# Patient Record
Sex: MALE | Race: WHITE | Employment: OTHER | URBAN - METROPOLITAN AREA
[De-identification: names, ages, dates, MRNs, and addresses within clinical notes are randomized per-mention and may not be internally consistent; named-entity substitution may affect disease eponyms.]

---

## 2020-01-26 ENCOUNTER — HOSPITAL ENCOUNTER (INPATIENT)
Age: 35
LOS: 3 days | Discharge: HOME | DRG: 751 | End: 2020-01-29
Attending: EMERGENCY MEDICINE | Admitting: PSYCHIATRY & NEUROLOGY
Payer: MEDICAID

## 2020-01-26 PROBLEM — F33.9 MAJOR DEPRESSION, RECURRENT (HCC): Status: ACTIVE | Noted: 2020-01-26

## 2020-01-26 PROBLEM — F33.9 MAJOR DEPRESSIVE DISORDER, RECURRENT (HCC): Status: ACTIVE | Noted: 2020-01-26

## 2020-01-26 PROCEDURE — 99285 EMERGENCY DEPT VISIT HI MDM: CPT

## 2020-01-26 PROCEDURE — 6370000000 HC RX 637 (ALT 250 FOR IP): Performed by: PSYCHIATRY & NEUROLOGY

## 2020-01-26 PROCEDURE — 1240000000 HC EMOTIONAL WELLNESS R&B

## 2020-01-26 RX ORDER — NICOTINE 21 MG/24HR
1 PATCH, TRANSDERMAL 24 HOURS TRANSDERMAL DAILY
Status: DISCONTINUED | OUTPATIENT
Start: 2020-01-26 | End: 2020-01-29 | Stop reason: HOSPADM

## 2020-01-26 RX ORDER — BENZTROPINE MESYLATE 1 MG/ML
2 INJECTION INTRAMUSCULAR; INTRAVENOUS 2 TIMES DAILY PRN
Status: DISCONTINUED | OUTPATIENT
Start: 2020-01-26 | End: 2020-01-29 | Stop reason: HOSPADM

## 2020-01-26 RX ORDER — IBUPROFEN 400 MG/1
400 TABLET ORAL EVERY 6 HOURS PRN
Status: DISCONTINUED | OUTPATIENT
Start: 2020-01-26 | End: 2020-01-29 | Stop reason: HOSPADM

## 2020-01-26 RX ORDER — HYDROXYZINE HYDROCHLORIDE 25 MG/1
25 TABLET, FILM COATED ORAL 3 TIMES DAILY PRN
Status: DISCONTINUED | OUTPATIENT
Start: 2020-01-26 | End: 2020-01-29 | Stop reason: HOSPADM

## 2020-01-26 RX ORDER — ACETAMINOPHEN 325 MG/1
650 TABLET ORAL EVERY 4 HOURS PRN
Status: DISCONTINUED | OUTPATIENT
Start: 2020-01-26 | End: 2020-01-29 | Stop reason: HOSPADM

## 2020-01-26 RX ORDER — MAGNESIUM HYDROXIDE/ALUMINUM HYDROXICE/SIMETHICONE 120; 1200; 1200 MG/30ML; MG/30ML; MG/30ML
15 SUSPENSION ORAL EVERY 6 HOURS PRN
Status: DISCONTINUED | OUTPATIENT
Start: 2020-01-26 | End: 2020-01-29 | Stop reason: HOSPADM

## 2020-01-26 RX ORDER — TRAZODONE HYDROCHLORIDE 50 MG/1
50 TABLET ORAL NIGHTLY PRN
Status: DISCONTINUED | OUTPATIENT
Start: 2020-01-26 | End: 2020-01-29 | Stop reason: HOSPADM

## 2020-01-26 RX ADMIN — TRAZODONE HYDROCHLORIDE 50 MG: 50 TABLET ORAL at 20:50

## 2020-01-26 RX ADMIN — HYDROXYZINE HYDROCHLORIDE 25 MG: 25 TABLET, FILM COATED ORAL at 20:50

## 2020-01-26 ASSESSMENT — PAIN SCALES - GENERAL: PAINLEVEL_OUTOF10: 0

## 2020-01-26 ASSESSMENT — PATIENT HEALTH QUESTIONNAIRE - PHQ9: SUM OF ALL RESPONSES TO PHQ QUESTIONS 1-9: 6

## 2020-01-26 ASSESSMENT — SLEEP AND FATIGUE QUESTIONNAIRES
DO YOU USE A SLEEP AID: NO
DO YOU HAVE DIFFICULTY SLEEPING: NO
AVERAGE NUMBER OF SLEEP HOURS: 8

## 2020-01-26 ASSESSMENT — LIFESTYLE VARIABLES: HISTORY_ALCOHOL_USE: NO

## 2020-01-26 NOTE — ED PROVIDER NOTES
tobacco. He reports previous alcohol use. He reports current drug use. Drug: Marijuana. Family History: Noncontributory at this time  Psychiatric History: See PMH  Allergies:has No Known Allergies. PHYSICAL EXAM     INITIAL VITALS: BP: (!) 128/92  Pulse: 96  Resp: 18  Temp: 98 °F (36.7 °C) SpO2: 100 %     Constitutional:  Well developed, no acute distress   Eyes:  Pupils equal and readily reactive to light  HENT:  Atraumatic, external ears normal, nose normal, oropharynx moist. Neck- supple    Respiratory:  Clear to auscultation bilaterally with good air exchange  Cardiovascular:  RRR with normal S1 and S2  GI:  Soft, nondistended and nontender   Musculoskeletal:  No edema, no tenderness, no deformities  Integument:  No rash  Neurologic:  Alert & oriented x 4, no focal deficits noted   Psychiatric: Anxious      EMERGENCY DEPARTMENT COURSE     78-year-old male presents with suicidal thoughts with a plan to cut his wrist.  He is afebrile, nontoxic, normal vital signs. Not in acute distress. He has no other medical complaints. He does appear slightly anxious. Patient is medically cleared for psychiatric evaluation and likely admission. FINAL IMPRESSION     1. Depression with suicidal ideation          DISPOSITION:  DISPOSITION Decision To Admit 01/26/2020 03:28:26 PM        PATIENT REFERRED TO:  No follow-up provider specified. DISCHARGE MEDICATIONS:  New Prescriptions    No medications on file       (Please note that portions of this note were completed with a voice recognition program. Efforts were made to edit the dictations but occasionally words are mis-transcribed.  Whenever words are used in this note in any gender, they shall be construed as though they were used in the gender appropriate to the circumstances; and whenever words are used in this note in the singular or plural form, they shall be construed as though they were used in the form appropriate to the circumstances.)    Veronica Mandujano Taryn Duarte DO  Attending Emergency Medicine Physician        Melissa Lyons,   01/26/20 0879

## 2020-01-26 NOTE — ED NOTES
Provisional Diagnosis:   Major Depressive Disorder    Psychosocial and Contextual Factors:     Patient has recent loss of loved one. Patient has family issues. C-SSRS Summary:      Patient: X  Family:   Agency:         Present Suicidal Behavior:  X    Verbal:  Patient reports suicidal ideations with plan to swallow cyanide. Attempt: Patient denies. Past Suicidal Behavior: Patient denies. Verbal:     Attempt:       Substance Abuse: Patient reports marijuana use. Self-Injurious/Self-Mutilation: Patient denies. Trauma Identified:  Patient denies. Protective Factors:    Patient has housing. Patient has insurance. Risk Factors:    Patient is not linked. Patient is not taking psych medications. Patient has minimal support system. Clinical Summary:    Patient is a 37year old male that is brought to the ED today by [employer] for suicidal ideations. Patient reports he had a knife in his hand and was contemplating cutting his wrists. Patient reprots his wife called 911 and the  brought him to the ER. Patient reprots if she did not call the police \"I would probably be dead right now. \" Patient reports he has had suicidal ideations for the past 2 weeks after losing his grandma. Patient reports he had been thinking about eating cyanide as well. Patient denies any previous attempts to harm himself. Patient denies H/I at this time. Patient denies auditory or visual hallucinations. Patient reports he is not currently linked to a mental health agency or taking any psych medications. Patient tells this writer he is Raymon Rayo" being in the ER today. Patient states \"I want to think I can do this but I don't know because I have never felt this way before. \"     Patient reports he use to drink alcohol heavily but no longer drinks alcohol. Patient reports occasional marijuana use. Patient denies any other drug use at this time.      Patient reports having safe housing with his wife who

## 2020-01-26 NOTE — BH NOTE
techniques in how to quit, available resources  ( ) Referral for counseling faxed to [facility]                                           ( X) Patient refused counseling  ( ) Patient has not smoked in the last 30 days    Metabolic Screening:    No results found for: LABA1C    No results found for: CHOL  No results found for: TRIG  No results found for: HDL  No components found for: LDLCAL  No results found for: LABVLDL      Body mass index is 18.8 kg/m². BP Readings from Last 2 Encounters:   01/26/20 122/79           Pt admitted with followings belongings:  Dentures: None  Vision - Corrective Lenses: None  Hearing Aid: None  Jewelry: None  Clothing: Footwear, Sweater, Shirt, Pants  Were All Patient Medications Collected?: Not Applicable  Other Valuables: [address] placed in safe in security envelope, number:  [id number]. No Patient home medications to be verified, Dr. Rosemarie Art notified. Patient oriented to surroundings and program expectations and copy of patient rights given. Received admission packet:  YES. Consents reviewed, signed Yes. Refused n/a. Patient verbalize understanding:  Yes. Patient education on precautions: Suicidal thoughts and patient safety.                     Edwin Chao RN

## 2020-01-27 PROBLEM — F33.1 MODERATE EPISODE OF RECURRENT MAJOR DEPRESSIVE DISORDER (HCC): Status: ACTIVE | Noted: 2020-01-26

## 2020-01-27 PROCEDURE — 1240000000 HC EMOTIONAL WELLNESS R&B

## 2020-01-27 PROCEDURE — 99253 IP/OBS CNSLTJ NEW/EST LOW 45: CPT | Performed by: PHYSICIAN ASSISTANT

## 2020-01-27 PROCEDURE — 6370000000 HC RX 637 (ALT 250 FOR IP): Performed by: PSYCHIATRY & NEUROLOGY

## 2020-01-27 PROCEDURE — 90833 PSYTX W PT W E/M 30 MIN: CPT | Performed by: REGISTERED NURSE

## 2020-01-27 PROCEDURE — 6370000000 HC RX 637 (ALT 250 FOR IP): Performed by: REGISTERED NURSE

## 2020-01-27 PROCEDURE — 99222 1ST HOSP IP/OBS MODERATE 55: CPT | Performed by: REGISTERED NURSE

## 2020-01-27 RX ADMIN — HYDROXYZINE HYDROCHLORIDE 25 MG: 25 TABLET, FILM COATED ORAL at 12:29

## 2020-01-27 RX ADMIN — SERTRALINE HYDROCHLORIDE 50 MG: 50 TABLET ORAL at 12:29

## 2020-01-27 RX ADMIN — TRAZODONE HYDROCHLORIDE 50 MG: 50 TABLET ORAL at 21:58

## 2020-01-27 ASSESSMENT — SLEEP AND FATIGUE QUESTIONNAIRES
AVERAGE NUMBER OF SLEEP HOURS: 10
DO YOU HAVE DIFFICULTY SLEEPING: NO
DO YOU USE A SLEEP AID: NO

## 2020-01-27 ASSESSMENT — PATIENT HEALTH QUESTIONNAIRE - PHQ9: SUM OF ALL RESPONSES TO PHQ QUESTIONS 1-9: 6

## 2020-01-27 ASSESSMENT — LIFESTYLE VARIABLES: HISTORY_ALCOHOL_USE: NO

## 2020-01-27 NOTE — H&P
HISTORY and [facility] [address] [id number]       NAME:  Sueann Angelucci  MRN: [id number]   YOB: 1985   Date: 1/27/2020   Age: 37 y.o. Gender: male     COMPLAINT AND PRESENT HISTORY:      Sueann Angelucci is 37 y.o.,  male, admitted because of depression/ Bipolar Disorder. Pt has suicidal ideation, Pt has plans to cut self. Pt denies any homicidal ideation. Pt has no history of previous suicide attempts. Pt feels hopeless, helpless, worthless, lack of interest, loss of energy. Poor insight. Pt has fair sleep, appetite, concentration and memory. No current auditory, visual or tactile hallucinations. Patient denies any current alcohol or substance abuse. Has been sober from alcohol for 5 months patient lives with spouse, currently in between jobs. Pt is not taking any psychiatric medications. DIAGNOSTIC RESULTS   Labs:      PAST MEDICAL HISTORY     Past Medical History:   Diagnosis Date    Alcohol abuse     Kidney stones     Seizures ([address]     [employer] [facility]        Pt denies any history of Diabetes mellitus type 2, hypertension, stroke, heart disease, COPD, Asthma, GERD, HLD, Cancer, Seizures,Thyroid disease, Kidney Disease, Hepatitis, TB.    SURGICAL HISTORY     History reviewed. No pertinent surgical history.     FAMILY HISTORY       Family History   Family history unknown: Yes       SOCIAL HISTORY       Social History     Socioeconomic History    Marital status: Single     Spouse name: None    Number of children: None    Years of education: None    Highest education level: None   Occupational History    None   Social Needs    Financial resource strain: None    Food insecurity:     Worry: None     Inability: None    Transportation needs:     Medical: None     Non-medical: None   Tobacco Use    Smoking status: Current Every Day Smoker     Packs/day: 0.50     Types: Cigarettes    Smokeless tobacco: Former User   Substance and Sexual Activity    Alcohol use: Not Currently     Comment: Sober since September 2018    Drug use: Yes     Types: Marijuana     Comment: very rare    Sexual activity: Yes     Partners: Female   Lifestyle    Physical activity:     Days per week: None     Minutes per session: None    Stress: None   Relationships    Social connections:     Talks on phone: None     Gets together: None     Attends Alevism service: None     Active member of club or organization: None     Attends meetings of clubs or organizations: None     Relationship status: None    Intimate partner violence:     Fear of current or ex partner: None     Emotionally abused: None     Physically abused: None     Forced sexual activity: None   Other Topics Concern    None   Social History Narrative    None           REVIEW OF SYSTEMS      Allergies   Allergen Reactions    Cashews [Macadamia Nut Oil]     Mushroom Extract Complex        No current facility-administered medications on file prior to encounter. No current outpatient medications on file prior to encounter. General health:  Fairly good. No fever or chills. Skin:  No itching, redness or rash. Head, eyes, ears, nose, throat:  No headache, epistaxis, rhinorrhea hearing loss or sore throat. Neck:  No pain, stiffness or masses. Cardiovascular/Respiratory system:  No chest pain, palpitation, shortness of breath, coughing or expectoration. Gastrointestinal tract: No abdominal pain, nausea, vomiting, dysphagia, diarrhea or constipation. Genitourinary:  No burning on micturition. No hesitancy, urgency, frequency or discoloration of urine. Locomotor:  No bone or joint pains. No swelling or deformities. Neuropsychiatric:  See HPI.      GENERAL PHYSICAL EXAM:     Vitals: /66   Pulse 90   Temp 97.9 °F (36.6 °C) (Oral)   Resp 14   Ht 5' 10\" (1.778 m)   Wt 131 lb (59.4 kg)   SpO2 100%   BMI 18.80 kg/m²  Body mass index is 18.8

## 2020-01-27 NOTE — PLAN OF CARE
Patient is controlled and cooperative, patient attended night groups. Patient is out and social with peers. Patient denies any suicidal ideations, states he has been stressed out at home taking care of his wife and children which all had the flu at the same time. Patient states he has not been sleeping well and just needs a break. Patient has good insight. Q15 min safety checks maintained.

## 2020-01-27 NOTE — PLAN OF CARE
Patient pleasant on approach. Patient denied suicidal and homicidal ideations. Patient voiced feeling depressed off then on due to life stressors. Patient remain safe while on the unit. Patient encourage to continue his medication regime. 100 % meal intake. 15 MIN safety checks.

## 2020-01-27 NOTE — CARE COORDINATION
BHI Biopsychosocial Assessment    Current Level of Psychosocial Functioning     Independent   Dependent    Minimal Assist X    Psychosocial High Risk Factors     Unable to obtain meds   Chronic illness/pain    Substance abuse   Lack of Family Support   Financial stress   Isolation   Inadequate Community Resources  Suicide attempt(s)  Self Mutilation  Safety Plan X- safety plan provided and explained to patient to fill out and return to staff   Not taking medications X- not linked  Victim of crime   Developmental Delay  Unable to manage personal needs    Age 72 or older   Homeless  Legal Issues  No transportation   Readmission within 30 days  Unemployment  Traumatic Event X- death of grandmother 2 weeks ago     Psychiatric Advanced Directives:  none    Family to Involve in Treatment: wife Madalyn Means but refused WILVER reports he will get updated     Sexual Orientation:  Heerosexual    Patient Strengths: stable housing, self employed construction, transportation, support system, motivated    Patient Barriers:  Not linked, unresolved grief, no insurance     Opiate/AOD Education Provided:   No hx of AOD dependence     CMHC/mental health history: no prior hx     Plan of Care   medication management, group/individual therapies, family meetings, psycho -education, treatment team meetings to assist with stabilization    Initial Discharge Plan:  Link to [address] [place] [address] on file and taxi home if wife unable to      Clinical Summary:   Patient is a  37year old  male admitted to the [facility] for suicidal ideations with plan to poison self and not able to contract for safety. Patient currently is denying any suicidal / homicidal ideations and is denying any auditory / visual hallucinations currently. Patient reports hx of \"depressive times\" and reports they come and go, but none has severe as of now.   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

## 2020-01-27 NOTE — PLAN OF CARE
[facility]  Initial Interdisciplinary Treatment Plan NO      Original treatment plan Date & Time: 1/27/2020 0822    Admission Type:  Admission Type: Voluntary    Reason for admission:   Reason for Admission: Suicidal thoughts to harm self     Estimated Length of Stay:  5-7days  Estimated Discharge Date: to be determined by physician    PATIENT STRENGTHS:  Patient Strengths:Strengths: Social Skills, Communication, Positive Support  Patient Strengths and Limitations:Limitations: Inappropriate/potentially harmful leisure interests, Difficulty problem solving/relies on others to help solve problems  Addictive Behavior: Addictive Behavior  In the past 3 months, have you felt or has someone told you that you have a problem with:  : None  Do you have a history of Chemical Use?: No  Do you have a history of Alcohol Use?: No  Do you have a history of Street Drug Abuse?: No  Histroy of Prescripton Drug Abuse?: No  Medical Problems:  Past Medical History:   Diagnosis Date    Seizures ([address]     [employer] [facility]      Status EXAM:Status and Exam  Normal: No  Facial Expression: Flat  Affect: Appropriate  Level of Consciousness: Alert  Mood:Normal: No  Mood: Anxious, Sad, Depressed  Motor Activity:Normal: Yes  Interview Behavior: Cooperative  Preception: [place] to Person, [place] to Time, [place] to Place, [place] to Situation  Attention:Normal: Yes  Thought Content:Normal: Yes  Hallucinations: None  Delusions: No  Memory:Normal: Yes  Insight and Judgment: No  Insight and Judgment: Poor Insight  Present Suicidal Ideation: No  Present Homicidal Ideation: No    EDUCATION:   Learner Progress Toward Treatment Goals: reviewed group plans and strategies for care    Method:group therapy, medication compliance, individualized assessments and care planning    Outcome: needs reinforcement    PATIENT GOALS: to be discussed with patient within 72 hours    PLAN/TREATMENT RECOMMENDATIONS:     continue group therapy , medications compliance, goal setting, individualized assessments and care, continue to monitor pt on unit      SHORT-TERM GOALS:   Time frame for Short-Term Goals: 5-7 days    LONG-TERM GOALS:  Time frame for Long-Term Goals: 6 months  Members Present in Team Meeting: See [address]

## 2020-01-27 NOTE — GROUP NOTE
Group Therapy Note    Date: 1/27/2020    Group Start Time: 1000  Group End Time: 1152  Group Topic: Cognitive Skills    [facility] YAN Krishnamurthy    Patient refused to attend cognitive skills/ leisure group at 1000 after encouragement from staff. 1:1 talk time provided by staff.     Signature:  Cornel Krishnamurthy

## 2020-01-27 NOTE — BH NOTE
Psychiatric Admission Note         Bouchra Churchill is a 37 y.o. male who was admitted from [facility] ED with increasing depression and suicidal ideation. He states his grandmother  2 weeks ago and his depression has increased. He told his wife he was contemplating cutting his wrists. His wife called the police. He reports today that he has had depression on and off for many years. He states he has never taken any antidepressant. He states he always has just \"pulled himself up by his bootstraps\". The patient presents with feelings of being sad, depressed, hopelessness and helpless, loss of interest in usual activities, sleep disturbance with difficulty getting to sleep, feelings of guilt, worthlessness and loss of self confidence. The patient also reports psychomotor retardation. He denies hallucinations or paranoia. He denies any psychotic features. He denies symptoms of hosea. Past Psychiatric History   Patient Denies any history of outpt mental health care. Denied history of psychiatric inpatient hospitalizations. Denied history of suicide attempts. History of Substance Abuse     He reports he uses marijuana occasionally. He states he used to drink heavily but quit drinking 6 months ago because his wife is pregnant. Family History of psychiatric disorders    Family history: denied . Past psychiatric medications  None    Medical History   Allergies:  Cashews [macadamia nut oil] and Mushroom extract complex   Past Medical History:   Diagnosis Date    Seizures ([address]     [employer] [facility]       History reviewed. No pertinent surgical history. Neurologic Exam    See H&P for further physical examination. SOCIAL HISTORY   He reports he was raised by his mother. His father was not a part of his life. His grandmother who recently passed was a big part of his life and raised him at some points of his life. He reports he has siblings.   He has homicidal ideations . Patient's gross cognitive functions were impaired. Insight and judgement were fair. Both recent and remote memory were impaired. Psychomotor status was without abnormality     Labs  No results found for this or any previous visit (from the past 72 hour(s)). Diagnostic Impression  Principal Problem: Moderate episode of recurrent major depressive disorder ([facility] [address]  Resolved Problems:    * No resolved hospital problems. *          Medications   sertraline  50 mg Oral Daily    nicotine  1 patch Transdermal Daily     acetaminophen, traZODone, benztropine mesylate, magnesium hydroxide, aluminum & magnesium hydroxide-simethicone, hydrOXYzine, ibuprofen    Treatment Plan:     Admit to inpatient psychiatric treatment   Supportive therapy with medication management. Reviewed risks and benefits as well as potential side effects with patient. Start Zoloft 50 mg daily.  Therapeutic activities and groups   Follow up at [facility] after symptoms stabilized   Provided supportive psychotherapy for 20 minutes that included support, psychoeducation about major depression, education about coping methods and empatic listening. Estimated length of stay: 5-7 days    KATE Sanchez - CNS  Psychiatric Advanced Practice Nurse  Tanna voice recognition software used in portions of this document.  Occasionally words are mis-transcribed

## 2020-01-28 PROCEDURE — 1240000000 HC EMOTIONAL WELLNESS R&B

## 2020-01-28 PROCEDURE — 99232 SBSQ HOSP IP/OBS MODERATE 35: CPT | Performed by: REGISTERED NURSE

## 2020-01-28 PROCEDURE — 6370000000 HC RX 637 (ALT 250 FOR IP): Performed by: PSYCHIATRY & NEUROLOGY

## 2020-01-28 RX ADMIN — TRAZODONE HYDROCHLORIDE 50 MG: 50 TABLET ORAL at 20:50

## 2020-01-28 RX ADMIN — HYDROXYZINE HYDROCHLORIDE 25 MG: 25 TABLET, FILM COATED ORAL at 20:50

## 2020-01-28 NOTE — BH NOTE
Patient refused to attend health/wellness group at 4pm after encouragement from staff. 1:1 talk time offered but refused.

## 2020-01-28 NOTE — PLAN OF CARE
Nutrition (WDL): Within Defined Limits    Patient Monitoring:  Frequency of Checks: 4 times per hour, close    Psychiatric Symptoms:   Depression Symptoms  Depression Symptoms: No problems reported or observed. Anxiety Symptoms  Anxiety Symptoms: Generalized  Esme Symptoms  Seme Symptoms: No problems reported or observed. Psychosis Symptoms  Delusion Type: No problems reported or observed. Suicide Risk CSSR-S:  1) Within the past month, have you wished you were dead or wished you could go to sleep and not wake up? : Yes  2) Have you actually had any thoughts of killing yourself? : Yes  3) Have you been thinking about how you might kill yourself? : Yes  5) Have you started to work out or worked out the details of how to kill yourself? Do you intend to carry out this plan? : Yes  6) Have you ever done anything, started to do anything, or prepared to do anything to end your life?: No  Change in Result  NA  Change in Plan of care  NA       EDUCATION:   EDUCATION:   Learner Progress Toward Treatment Goals: Reviewed results and recommendations of this team, Reviewed group plan and strategies, Reviewed signs, symptoms and risk of self harm and violent behavior, Reviewed goals and plan of care    Method:small group, individual verbal education    Outcome:verbalized by patient, but needs reinforcement to obtain goals    PATIENT GOALS:  Short term: Patient short term goals include attending groups and continuing medication. Long term: Patient long term goals include getting back into old hobbies to keep busy, get back to work, continue taking medications.      PLAN/TREATMENT RECOMMENDATIONS UPDATE: continue with group therapies, increased socialization, continue planning for after discharge goals, continue with medication compliance    SHORT-TERM GOALS UPDATE:   Time frame for Short-Term Goals: 5-7 days    LONG-TERM GOALS UPDATE:   Time frame for Long-Term Goals: 6 months  Members Present in Team Meeting: See

## 2020-01-28 NOTE — GROUP NOTE
Group Therapy Note    Date: 1/28/2020    Group Start Time: 1000  Group End Time: 8847  Group Topic: Psychoeducation    [facility]      Patient declined to attend recreational therapy group at 1000 despite encouragement from staff. 1:1 talk time offered by staff as alternative to group session.       Signature:  Mariah Mejía

## 2020-01-28 NOTE — GROUP NOTE
Group Therapy Note    Date: 1/27/2020    Group Start Time: 2100  Group End Time: 2130  Group Topic: Wrap-Up    KRISHAN BHJUVENCIO Todd RN        Group Therapy Note    Attendees: 7           Discipline Responsible:   OT  AT   x Nsg.  RT  Other       Participation Level:     None  Minimal   x Active Listener x Interactive    Monopolizing         Participation Quality:  x Appropriate  Inappropriate   x       Attentive        Intrusive   x       Sharing        Resistant   x       Supportive        Lethargic       Affective:   x Congruent  Incongruent  Blunted  Flat    Constricted  Anxious  Elated  Angry    Labile  Depressed  Other         Cognitive:  x Alert  Oriented PPTP     Concentration x G  F  P   Attention Span x G  F  P   Short-Term Memory x G  F  P   Long-Term Memory x G  F  P   ProblemSolving/  Decision Making x G  F  P   Ability to Process  Information x G  F  P      Contributing Factors             Delusional             Hallucinating             Flight of Ideas             Other:       Modes of Intervention:  x Education  Support  Exploration   x Clarifying  Problem Solving  Confrontation    Socialization  Limit Setting  Reality Testing    Activity  Movement  Media    Other:            Response to Learning:  x Able to verbalize current knowledge/experience   x Able to verbalize/acknowledge new learning    Able to retain information    Capable of insight    Able to change behavior    Progressing to goal    Other:        Comments:

## 2020-01-28 NOTE — PLAN OF CARE
Patient is able to verbalize acceptance of life and situations over which he has no control over. Patient is provided with a safe environment, he is med compliant and behavior controlled.  Will continue to monitor patient for safety Q 15 MIN          Problem: Depressive Behavior With or Without Suicide Precautions:  Goal: Able to verbalize acceptance of life and situations over which he or she has no control  Description  Able to verbalize acceptance of life and situations over which he or she has no control  1/28/2020 1731 by Araceli Saini LPN  Outcome: Ongoing       Problem: Suicide risk  Goal: Provide patient with safe environment  Description  Provide patient with safe environment  1/28/2020 1731 by Araceli Saini LPN  Outcome: Ongoing

## 2020-01-28 NOTE — GROUP NOTE
Group Therapy Note    Date: 1/28/2020    Group Start Time: 1330  Group End Time: 1829  Group Topic: Psychoeducation    JENNIFER EldridgeS    Group Therapy Note    Attendees: 5    Patient's Goal:  Pt will demonstrate improved interpersonal skills    Notes:  Pt attended group and participated    Status After Intervention:  Improved    Participation Level:  Active Listener and Interactive    Participation Quality: Appropriate, Attentive, Sharing and Supportive      Speech:  normal      Thought Process/Content: Logical  Linear      Affective Functioning: Congruent      Mood: euthymic      Level of consciousness:  Alert, Oriented x4 and Attentive      Response to Learning: Able to verbalize current knowledge/experience, Able to verbalize/acknowledge new learning, Able to retain information, Capable of insight, Able to change behavior and Progressing to goal      Endings: None Reported    Modes of Intervention: Education, Support, Socialization, Exploration and Activity      Discipline Responsible: Psychoeducational Specialist      Signature:  John Saucedo [place]

## 2020-01-28 NOTE — GROUP NOTE
Group Therapy Note    Date: 1/28/2020    Group Start Time: 1000  Group End Time: 4212  Group Topic: Psychotherapy    [facility] [address] CIT[address] [place]        Group Therapy Note    Attendees: 4         Patient's Goal:  Expression of feeling     Notes:      Status After Intervention:  Unchanged    Participation Level:  Active Listener and Interactive    Participation Quality: Appropriate and Attentive      Speech:  normal      Thought Process/Content: Logical      Affective Functioning: Congruent      Mood: euthymic      Level of consciousness:  Alert and Oriented x4      Response to Learning: Able to verbalize current knowledge/experience and Able to verbalize/acknowledge new learning      Endings: None Reported    Modes of Intervention: Education and Support      Discipline Responsible: /Counselor      Signature:  CITLALY Henao

## 2020-01-28 NOTE — PLAN OF CARE
Problem: Depressive Behavior With or Without Suicide Precautions:  Goal: Able to verbalize acceptance of life and situations over which he or she has no control  Description  Able to verbalize acceptance of life and situations over which he or she has no control  1/27/2020 2241 by Isac Emerson LPN  Outcome: Ongoing  Note:   Patient denies all feelings of wanting to hurt himself or others. Patient irritable at times and does not like change. Patient was upset about getting a roommate and a sign put on his door. Patient easily redirected. Patient educated on seeking help from staff when feeling overwhelmed. Problem: Depressive Behavior With or Without Suicide Precautions:  Goal: Able to verbalize and/or display a decrease in depressive symptoms  Description  Able to verbalize and/or display a decrease in depressive symptoms  1/27/2020 2241 by Isac Emerson LPN  Outcome: Ongoing  Note:   Patient out of room and active with peers. Patient flat with responses but does make eye contact. Patient very distracted and hard to stay focused. Patient educated on seeking help from staff if feelings come back.

## 2020-01-28 NOTE — GROUP NOTE
Group Therapy Note    Date: 1/28/2020    Group Start Time: 0900  Group End Time: 0915  Group Topic: Community Meeting    [facility]    Patient refused to attend community meeting and goal setting group at 0900 after encouragement from staff. 1:1 talk time offered by staff as alternative to group session.

## 2020-01-29 VITALS
DIASTOLIC BLOOD PRESSURE: 68 MMHG | BODY MASS INDEX: 18.75 KG/M2 | TEMPERATURE: 98.1 F | HEART RATE: 100 BPM | HEIGHT: 70 IN | OXYGEN SATURATION: 100 % | RESPIRATION RATE: 14 BRPM | WEIGHT: 131 LBS | SYSTOLIC BLOOD PRESSURE: 115 MMHG

## 2020-01-29 PROCEDURE — 6370000000 HC RX 637 (ALT 250 FOR IP): Performed by: REGISTERED NURSE

## 2020-01-29 PROCEDURE — 99239 HOSP IP/OBS DSCHRG MGMT >30: CPT | Performed by: REGISTERED NURSE

## 2020-01-29 PROCEDURE — 6370000000 HC RX 637 (ALT 250 FOR IP): Performed by: PSYCHIATRY & NEUROLOGY

## 2020-01-29 RX ORDER — HYDROXYZINE HYDROCHLORIDE 25 MG/1
25 TABLET, FILM COATED ORAL 3 TIMES DAILY PRN
Qty: 30 TABLET | Refills: 0 | Status: SHIPPED | OUTPATIENT
Start: 2020-01-29 | End: 2020-02-08

## 2020-01-29 RX ORDER — TRAZODONE HYDROCHLORIDE 50 MG/1
50 TABLET ORAL NIGHTLY PRN
Qty: 14 TABLET | Refills: 0 | Status: SHIPPED | OUTPATIENT
Start: 2020-01-29

## 2020-01-29 RX ADMIN — HYDROXYZINE HYDROCHLORIDE 25 MG: 25 TABLET, FILM COATED ORAL at 12:06

## 2020-01-29 RX ADMIN — SERTRALINE HYDROCHLORIDE 50 MG: 50 TABLET ORAL at 07:31

## 2020-01-29 ASSESSMENT — PAIN SCALES - GENERAL: PAINLEVEL_OUTOF10: 0

## 2020-01-29 NOTE — PROGRESS NOTES
CLINICAL PHARMACY NOTE: MEDS TO [address] Select Patient?: No  Total # of Prescriptions Filled: 3   The following medications were delivered to the patient:  · Trazodone  · Sertraline  · Hydroxyzine  ·   Total # of Interventions Completed: 0  Time Spent (min): 30    Additional Documentation:
RT ASSESSMENT TREATMENT GOALS    [x]Pt Goal:  Pt will identify 1-2 positive coping skills by time of discharge. []Pt Goal:  Pt will identify 1-2 positive aspects of self by time of discharge. []Pt Goal:  Pt will remain on task/topic for 15-30 minutes during group by time of discharge. []Pt Goal:  Pt will identify 1-2 aspects of relapse prevention plan by time of discharge. [x]Pt Goal:  Pt will join in conversation with peers 1-2 times per group by time of discharge. [x]Pt Goal:  Pt will identify 1-2 new leisure interests by time of discharge. []Pt Goal:  Pt will not voice any delusional content by time of discharge.
(TYLENOL) tablet 650 mg  650 mg Oral Q4H PRN Jeff Izquierdo MD        traZODone (DESYREL) tablet 50 mg  50 mg Oral Nightly PRN Jeff Izquierdo MD   50 mg at 01/27/20 2158    benztropine mesylate (COGENTIN) injection 2 mg  2 mg Intramuscular BID PRN Jeff Izquierdo MD        magnesium hydroxide (MILK OF MAGNESIA) 400 MG/5ML suspension 30 mL  30 mL Oral Daily PRN Jeff Izquierdo MD        aluminum & magnesium hydroxide-simethicone (MAALOX) 200-200-20 MG/5ML suspension 15 mL  15 mL Oral Q6H PRN Jeff Izquierdo MD        nicotine (NICODERM CQ) 21 MG/24HR 1 patch  1 patch Transdermal Daily Jeff Izquierdo MD   1 patch at 01/28/20 5312    hydrOXYzine (ATARAX) tablet 25 mg  25 mg Oral TID PRN Jeff Izquierdo MD   25 mg at 01/27/20 1229    ibuprofen (ADVIL;MOTRIN) tablet 400 mg  400 mg Oral Q6H PRN Jeff Izquierdo MD             sertraline  50 mg Oral Daily    nicotine  1 patch Transdermal Daily       ASSESSMENT  Moderate episode of recurrent major depressive disorder ([facility] [address]     Patient's Response to Treatment: positive    PLAN    · Continue inpatient psychiatric treatment  · Supportive therapy with medication management. Reviewed risks and benefits as well as potential side effects with patient. · Continue Zoloft 50 mg daily. · Therapeutic activities and groups  · Follow up at [facility] after symptoms stabilized    Electronically signed by Cherylann Gottron, APRN - CNS on 1/28/2020 at 5:38 PM.    Dragon voice recognition software used in portions of this document.

## 2020-01-29 NOTE — DISCHARGE SUMMARY
DISCHARGE SUMMARY  Patient ID:  Sueann Angelucci  [id number]  37 y.o.  1985    Admit date: 2020    Discharge date and time: 2020  10:03 AM     Admitting Physician: Raven Francis MD     Discharge Physician:  KATE Lucas - CNS    Admission Diagnoses: Major depressive disorder, recurrent ([facility] [address] [F33.9]  Major depression, recurrent ([facility] [address] [F33.9]    Discharge Diagnoses: Moderate episode of recurrent major depressive disorder [facility])     Patient Active Problem List   Diagnosis Code    Moderate episode of recurrent major depressive disorder ([address] F33.1    Major depression, recurrent ([facility] [address] F33.9        Admission Condition: poor    Discharged Condition: stable    Indication for Admission: threat to self    History of Present Illnes (present tense wording indicates findings from admission exam on 2020 and are not necessarily indicative of current findings):   Sueann Angelucci is a 37 y.o. male who was admitted from [facility] with increasing depression and suicidal ideation. He states his grandmother  2 weeks ago and his depression has increased. He told his wife he was contemplating cutting his wrists. His wife called the police. He reports today that he has had depression on and off for many years. He states he has never taken any antidepressant. He states he always has just \"pulled himself up by his bootstraps\". The patient presents with feelings of being sad, depressed, hopelessness and helpless, loss of interest in usual activities, sleep disturbance with difficulty getting to sleep, feelings of guilt, worthlessness and loss of self confidence. The patient also reports psychomotor retardation. He denies hallucinations or paranoia. He denies any psychotic features. He denies symptoms of hosea. Hospital Course:   Upon admission, Sueann Angelucci was provided a safe secure environment, introduced to unit milieu. Patient participated in groups and individual therapies. Meds were adjusted. After few days of hospital care, patient began to feel improvement. Depression lifted, thoughts to harm self ceased. Sleep improved, appetite was good. On morning rounds 1/29/2020, patient endorsed feeling ready for discharge. Patient denies suicidal or homicidal ideations, denies hallucinations or delusions. Denies SE's from meds. It was decided that pt had achieved maximum benefit from hospital care and can be discharged     Consults:   None    Significant Diagnostic Studies: Routine labs and diagnostics    Treatments: Psychotropic medications, therapy with group, milieu, and 1:1 with nurses, social workers, PA-C/CNP, and Attending physician.       Discharge Medications:  Current Discharge Medication List      START taking these medications    Details   hydrOXYzine (ATARAX) 25 MG tablet Take 1 tablet by mouth 3 times daily as needed for Anxiety  Qty: 30 tablet, Refills: 0    Comments: meds to beds with voucher      sertraline (ZOLOFT) 50 MG tablet Take 1 tablet by mouth daily  Qty: 14 tablet, Refills: 0      traZODone (DESYREL) 50 MG tablet Take 1 tablet by mouth nightly as needed for Sleep  Qty: 14 tablet, Refills: 0              Core Measures statement:   Not applicable    Discharge Exam:  Level of consciousness:  Within normal limits  Appearance: Street clothes, seated, with good grooming  Behavior/Motor: No abnormalities noted  Attitude toward examiner:  Cooperative, attentive, good eye contact  Speech:  spontaneous, normal rate, normal volume and well articulated  Mood:  euthymic  Affect:  mood congruent  Thought processes:  linear, goal directed and coherent  Thought content:  Homocidal ideation denies  Suicidal Ideation:  denies suicidal ideation  Delusions:  no evidence of delusions  Perceptual Disturbance:  denies any perceptual disturbance  Cognition:  In tact  Memory: age appropriate  Insight & Judgement: fair  Medication side effects:  denies     Disposition: home    Patient Instructions:

## 2020-06-30 ENCOUNTER — HOSPITAL ENCOUNTER (EMERGENCY)
Age: 35
Discharge: HOME | End: 2020-06-30
Attending: EMERGENCY MEDICINE
Payer: MEDICAID

## 2020-06-30 VITALS
OXYGEN SATURATION: 98 % | TEMPERATURE: 98.4 F | RESPIRATION RATE: 18 BRPM | WEIGHT: 135 LBS | HEART RATE: 69 BPM | SYSTOLIC BLOOD PRESSURE: 100 MMHG | DIASTOLIC BLOOD PRESSURE: 66 MMHG

## 2020-06-30 LAB
ABSOLUTE EOS #: 0.03 K/UL (ref 0–0.44)
ABSOLUTE IMMATURE GRANULOCYTE: 0.03 K/UL (ref 0–0.3)
ABSOLUTE LYMPH #: 2.71 K/UL (ref 1.1–3.7)
ABSOLUTE MONO #: 0.41 K/UL (ref 0.1–1.2)
ACETAMINOPHEN LEVEL: <5 UG/ML (ref 10–30)
ANION GAP SERPL CALCULATED.3IONS-SCNC: 11 MMOL/L (ref 9–17)
BASOPHILS # BLD: 1 % (ref 0–2)
BASOPHILS ABSOLUTE: 0.06 K/UL (ref 0–0.2)
BUN BLDV-MCNC: 13 MG/DL (ref 6–20)
BUN/CREAT BLD: ABNORMAL (ref 9–20)
CALCIUM SERPL-MCNC: 8.8 MG/DL (ref 8.6–10.4)
CHLORIDE BLD-SCNC: 104 MMOL/L (ref 98–107)
CO2: 26 MMOL/L (ref 20–31)
CREAT SERPL-MCNC: 0.91 MG/DL (ref 0.7–1.2)
DIFFERENTIAL TYPE: ABNORMAL
EKG ATRIAL RATE: 76 BPM
EKG P AXIS: 6 DEGREES
EKG P-R INTERVAL: 126 MS
EKG Q-T INTERVAL: 372 MS
EKG QRS DURATION: 104 MS
EKG QTC CALCULATION (BAZETT): 418 MS
EKG R AXIS: 76 DEGREES
EKG T AXIS: 45 DEGREES
EKG VENTRICULAR RATE: 76 BPM
EOSINOPHILS RELATIVE PERCENT: 0 % (ref 1–4)
ETHANOL PERCENT: <0.01 %
ETHANOL: <10 MG/DL
GFR AFRICAN AMERICAN: ABNORMAL ML/MIN
GFR NON-AFRICAN AMERICAN: ABNORMAL ML/MIN
GFR SERPL CREATININE-BSD FRML MDRD: ABNORMAL ML/MIN/{1.73_M2}
GFR SERPL CREATININE-BSD FRML MDRD: ABNORMAL ML/MIN/{1.73_M2}
GLUCOSE BLD-MCNC: 160 MG/DL (ref 70–99)
HCT VFR BLD CALC: 44.2 % (ref 40.7–50.3)
HEMOGLOBIN: 14.6 G/DL (ref 13–17)
IMMATURE GRANULOCYTES: 0 %
LYMPHOCYTES # BLD: 34 % (ref 24–43)
MCH RBC QN AUTO: 31.2 PG (ref 25.2–33.5)
MCHC RBC AUTO-ENTMCNC: 33 G/DL (ref 28.4–34.8)
MCV RBC AUTO: 94.4 FL (ref 82.6–102.9)
MONOCYTES # BLD: 5 % (ref 3–12)
NRBC AUTOMATED: 0 PER 100 WBC
PDW BLD-RTO: 12.2 % (ref 11.8–14.4)
PLATELET # BLD: 322 K/UL (ref 138–453)
PLATELET ESTIMATE: ABNORMAL
PMV BLD AUTO: 10 FL (ref 8.1–13.5)
POTASSIUM SERPL-SCNC: 4.1 MMOL/L (ref 3.7–5.3)
RBC # BLD: 4.68 M/UL (ref 4.21–5.77)
RBC # BLD: ABNORMAL 10*6/UL
SALICYLATE LEVEL: <1 MG/DL (ref 3–10)
SEG NEUTROPHILS: 60 % (ref 36–65)
SEGMENTED NEUTROPHILS ABSOLUTE COUNT: 4.69 K/UL (ref 1.5–8.1)
SODIUM BLD-SCNC: 141 MMOL/L (ref 135–144)
TOXIC TRICYCLIC SC,BLOOD: NEGATIVE
WBC # BLD: 7.9 K/UL (ref 3.5–11.3)
WBC # BLD: ABNORMAL 10*3/UL

## 2020-06-30 PROCEDURE — 2580000003 HC RX 258: Performed by: STUDENT IN AN ORGANIZED HEALTH CARE EDUCATION/TRAINING PROGRAM

## 2020-06-30 PROCEDURE — 94770 HC ETCO2 MONITOR DAILY: CPT

## 2020-06-30 PROCEDURE — 85025 COMPLETE CBC W/AUTO DIFF WBC: CPT

## 2020-06-30 PROCEDURE — G0480 DRUG TEST DEF 1-7 CLASSES: HCPCS

## 2020-06-30 PROCEDURE — 80307 DRUG TEST PRSMV CHEM ANLYZR: CPT

## 2020-06-30 PROCEDURE — 99285 EMERGENCY DEPT VISIT HI MDM: CPT

## 2020-06-30 PROCEDURE — 2700000000 HC OXYGEN THERAPY PER DAY

## 2020-06-30 PROCEDURE — 80048 BASIC METABOLIC PNL TOTAL CA: CPT

## 2020-06-30 RX ORDER — 0.9 % SODIUM CHLORIDE 0.9 %
1000 INTRAVENOUS SOLUTION INTRAVENOUS ONCE
Status: COMPLETED | OUTPATIENT
Start: 2020-06-30 | End: 2020-06-30

## 2020-06-30 RX ADMIN — SODIUM CHLORIDE 1000 ML: 9 INJECTION, SOLUTION INTRAVENOUS at 10:09

## 2020-06-30 NOTE — ED NOTES
pts significant other at bedside. Pt alert at this time. Admits to smoking K2. Pt asking to leave. Pt instructed that he needs to stay. Pt remains on monitor. Lab work labeled and sent to lab.       Tyree De Jesus RN  06/30/20 1019

## 2020-06-30 NOTE — ED NOTES
Bed: 13  Expected date:   Expected time:   Means of arrival:   Comments:  AC [id number] Araceli Walden RN  06/30/20 5397

## 2020-06-30 NOTE — FLOWSHEET NOTE
[facility] [address]     Emergency/Trauma Note    PATIENT NAME: Josh Lara    Shift date: 6/30/20  Shift day: Tuesday   Shift # 1    Room # 13/13   Name: Josh Lara            Age: 38  Gender: adult          Baptism: Sikh  Place of Hindu: [facility]  Trauma/Incident type: ED Alert, Found Unresponsive  Admit Date & Time: 6/30/2020  9:52 AM    PATIENT/EVENT DESCRIPTION:  Josh Lara is a 38year old adult who arrived via ground ambulance. Per report the patient was found unresponsive in a field where he and some friends were hanging out. Patient did wake up in the ED and he is found to be under the influence. Pt to be admitted to 13/13. SPIRITUAL ASSESSMENT/INTERVENTION:     06/30/20 1019   Encounter Summary   Services provided to: Patient   Support System Spouse   Place of Jain None   Contact Yazidism No   Continue Visiting   (6/30/20)   Complexity of Encounter Moderate   Length of Encounter 30 minutes   Crisis   Type ED Alert   Assessment Coping;Helplessness   Intervention Sustaining presence/ Ministry of presence; Active listening   Outcome Coping     I tried to speak to the patient who kept falling asleep. I met his wife, Jesus Chowdhury, when she arrived at the hospital and escorted her to see the patient. I provided emotional and spiritual support. The patient was not very coherent but Jesus Chowdhury expressed appreciation before she left for work. PATIENT BELONGINGS:  No belongings noted    ANY BELONGINGS OF SIGNIFICANT VALUE NOTED:  No.    REGISTRATION STAFF NOTIFIED? Yes    WHAT IS YOUR SPIRITUAL CARE PLAN FOR THIS PATIENT?:  Chaplains are available for further spiritual and emotional support as requested by the patient.        Electronically signed by Mendel Marin, on 6/30/2020 at 10:35 AM.  Oliver Juarez  [phone]

## 2020-06-30 NOTE — ED PROVIDER NOTES
[facility]     Emergency Department     Faculty Attestation    I performed a history and physical examination of the patient and discussed management with the resident. I reviewed the residents note and agree with the documented findings and plan of care. Any areas of disagreement are noted on the chart. I was personally present for the key portions of any procedures. I have documented in the chart those procedures where I was not present during the key portions. I have reviewed the emergency nurses triage note. I agree with the chief complaint, past medical history, past surgical history, allergies, medications, social and family history as documented unless otherwise noted below. For Physician Assistant/ Nurse Practitioner cases/documentation I have personally evaluated this patient and have completed at least one if not all key elements of the E/M (history, physical exam, and MDM). Additional findings are as noted. Primary Care Physician:  No primary care provider on file. CHIEF COMPLAINT       Chief Complaint   Patient presents with    Drug Overdose     pt found with friend Maury feldman outside. pts girlfriend on scene and called ems, states that he did K2. pt unresponsive for ems. pt with pinpoint pupils for ems, given narcan with no response. pt arrives to er, maintaining own airway, not verbal at this time. purposeful movement.         RECENT VITALS:   Temp: 98.4 °F (36.9 °C),  Pulse: 79, Resp: 18, BP: 127/77    LABS:  Labs Reviewed   CBC WITH AUTO DIFFERENTIAL   BASIC METABOLIC PANEL   TOX SCR, BLD, ED         PERTINENT ATTENDING PHYSICIAN COMMENTS:    Patient is here after apparently overdosing on K2 according to witnesses he was found another gentleman arrival he is minimally arousable maintaining his airway he does answer questions with stimulation    [address],  MD, [facility]  Attending Emergency  Physician              Hemant Estimable Dorothy Martinez MD  06/30/20 1007

## 2020-06-30 NOTE — ED PROVIDER NOTES
[address]  ED  Emergency Department Encounter  EmergencyMedicine Resident     Pt Ozzie Edwards  MRN: [id number]  [place] 1985  Date of evaluation: 6/30/20  PCP:  No primary care provider on file. CHIEF COMPLAINT       Chief Complaint   Patient presents with    Drug Overdose     pt found with friend Anna feldman outside. pts girlfriend on scene and called ems, states that he did K2. pt unresponsive for ems. pt with pinpoint pupils for ems, given narcan with no response. pt arrives to er, maintaining own airway, not verbal at this time. purposeful movement. HISTORY OF PRESENT ILLNESS  (Location/Symptom, Timing/Onset, Context/Setting, Quality, Duration, Modifying Factors, Severity.)      Charmaine Yin is a 37 y.o. male who presents with EMS for reported drug overdose. Patient was found in a field with another individual minimally responsive, breathing rapidly pupils dilated, was given 2 mg Narcan with no effect. EMS suspects possible K2 overdose. No obvious signs of trauma and IV was established prior to arrival.  Blood sugar was greater than 100 for EMS. My exam patient is sitting upright eyes open nonverbal, is reacting and withdrawing to pain, breathing rapidly 20 times a minute mildly tachycardic at 100, pupils are dilated, unable to answer any questions. We do not have patient's name or any other history. PAST MEDICAL / SURGICAL / SOCIAL / FAMILY HISTORY      has a past medical history of Alcohol abuse, Kidney stones, and Seizures ([facility] [address] has no past surgical history on file.     Social History     Socioeconomic History    Marital status: Single     Spouse name: Not on file    Number of children: Not on file    Years of education: Not on file    Highest education level: Not on file   Occupational History    Not on file   Social Needs    Financial resource strain: Not on file    Food insecurity     Worry: Not on file     Inability: Not on file   [facility] upright eyes open, nonverbal    HEENT: Head: normocephalic/atraumatic eyes: 6 mm PERRLA, EOMT, conjunctiva not injected, sclerae nonicteric ears: External canals patent nose: Nares patent, no rhinorrhea, throat:mucous membranes moist, oropharynx clear     Neck: Trachea midline, no JVD. No no obvious trauma to the neck or back    Lungs: No evidence of increased work of breathing. CTA B/L, no wheezes/rhonchi   Equal chest rise no crepitus    Cardiovascular: RRR, no murmur, 2+ peripheral pulses bilaterally. Cap refill less than 2 seconds. No lower extremity edema noted    Abdomen: Soft, nontender. No guarding or rebound tenderness. Neurologic: Nonverbal does track my movements does localize pain in all extremities    Moving all extremities    Extremities: Skin warm, dry and intact.   No obvious bruising or signs of trauma to the extremities    DIFFERENTIAL  DIAGNOSIS     PLAN (LABS / IMAGING / EKG):  Orders Placed This Encounter   Procedures    CBC WITH AUTO DIFFERENTIAL    Basic Metabolic Panel    TOX SCR, BLD, ED    EKG 12 Lead       MEDICATIONS ORDERED:  Orders Placed This Encounter   Medications    0.9 % sodium chloride bolus           DIAGNOSTIC RESULTS / EMERGENCY DEPARTMENT COURSE / MDM     LABS:  Results for orders placed or performed during the hospital encounter of 06/30/20   CBC WITH AUTO DIFFERENTIAL   Result Value Ref Range    WBC 7.9 k/uL    RBC 4.68 m/uL    Hemoglobin 14.6 g/dL    Hematocrit 44.2 %    MCV 94.4 fL    MCH 31.2 pg    MCHC 33.0 g/dL    RDW 12.2 %    Platelets 713 k/uL    MPV 10.0 fL    NRBC Automated 0.0 per 100 WBC    Differential Type NOT REPORTED     Seg Neutrophils 60 %    Lymphocytes 34 %    Monocytes 5 %    Eosinophils % 0 %    Basophils 1 %    Immature Granulocytes 0 %    Segs Absolute 4.69 k/uL    Absolute Lymph # 2.71 k/uL    Absolute Mono # 0.41 k/uL    Absolute Eos # 0.03 k/uL    Basophils Absolute 0.06 k/uL    Absolute Immature Granulocyte 0.03 k/uL    WBC Morphology NOT REPORTED     RBC Morphology NOT REPORTED     Platelet Estimate NOT REPORTED    Basic Metabolic Panel   Result Value Ref Range    Glucose 160 mg/dL    BUN 13 mg/dL    CREATININE 0.91 mg/dL    Bun/Cre Ratio NOT REPORTED     Calcium 8.8 mg/dL    Sodium 141 mmol/L    Potassium 4.1 mmol/L    Chloride 104 mmol/L    CO2 26 mmol/L    Anion Gap 11 mmol/L    GFR Non-African American  mL/min     Unable to calculate due to missing demographic information. GFR African American  mL/min     Unable to calculate due to missing demographic information. GFR Comment NOT REPORTED     GFR Staging NOT REPORTED    TOX SCR, BLD, ED   Result Value Ref Range    Acetaminophen Level <5 ug/mL    Ethanol <10 mg/dL    Ethanol percent <5.980 %    Salicylate Lvl <1 mg/dL    Toxic Tricyclic Sc,Blood NEGATIVE    EKG 12 Lead   Result Value Ref Range    Ventricular Rate 76 BPM    Atrial Rate 76 BPM    P-R Interval 126 ms    QRS Duration 104 ms    Q-T Interval 372 ms    QTc Calculation (Bazett) 418 ms    P Axis 6 degrees    R Axis 76 degrees    T Axis 45 degrees           RADIOLOGY:  No results found. EKG  None    All EKG's are interpreted by the Emergency Department Physician who either signs or Co-signs this chart in the absence of a cardiologist.    EMERGENCY DEPARTMENT COURSE/IMPRESSION:    Patient brought in reportedly unresponsive after drug overdose. Possible K2 overdose. Does not appear to be an opioid overdose based on vital signs and nonresponse to Narcan. Patient is otherwise maintain a patent airway adequate respirations adequate blood pressure and vital signs are stable.   Will place on end-tidal monitoring, cardiac monitor get EKG check electrolytes, ED tox, IV fluids, reassess, no obvious signs of trauma requiring any scans at this time    ED Course as of Jun 30 2014 Tue Jun 30, 2020   1014 Repeat assessment patient more awake sitting upright admits to smoking K2, will continue to briefly monitor get lab results

## 2020-06-30 NOTE — ED NOTES
Pt becoming more alert at this time. Alert and oriented x 3. Answering all questions appropriately now.       Eveline Bloch, RN  06/30/20 1002

## 2020-06-30 NOTE — ED NOTES
Pt 90% on room air. Pt arrives to er on NRB mask. Pt switched over to 2L nasal cannula. Pt now 97% on 2L.       Caroline Sun RN  06/30/20 1014

## 2020-06-30 NOTE — ED NOTES
Dr. Uriel Siddiqi and Dr. Ruffin at bedside to evaluate patient.       Tyree De Jesus RN  06/30/20 1002

## 2020-06-30 NOTE — ED NOTES
Pt alert and oriented x 4. Pt taken off monitor. Ambulated in room.       Caroline Sun RN  06/30/20 4018

## 2020-10-11 ENCOUNTER — HOSPITAL ENCOUNTER (EMERGENCY)
Age: 35
Discharge: HOME | End: 2020-10-11
Attending: EMERGENCY MEDICINE
Payer: MEDICAID

## 2020-10-11 VITALS
SYSTOLIC BLOOD PRESSURE: 124 MMHG | BODY MASS INDEX: 20.76 KG/M2 | OXYGEN SATURATION: 98 % | RESPIRATION RATE: 16 BRPM | WEIGHT: 145 LBS | DIASTOLIC BLOOD PRESSURE: 77 MMHG | HEIGHT: 70 IN | TEMPERATURE: 97.9 F | HEART RATE: 71 BPM

## 2020-10-11 LAB
ABSOLUTE EOS #: 0 K/UL (ref 0–0.4)
ABSOLUTE IMMATURE GRANULOCYTE: 0.11 K/UL (ref 0–0.3)
ABSOLUTE LYMPH #: 0.64 K/UL (ref 1–4.8)
ABSOLUTE MONO #: 0.21 K/UL (ref 0.1–0.8)
ALBUMIN SERPL-MCNC: 4.8 G/DL (ref 3.5–5.2)
ALBUMIN/GLOBULIN RATIO: 1.8 (ref 1–2.5)
ALP BLD-CCNC: 94 U/L (ref 40–129)
ALT SERPL-CCNC: 11 U/L (ref 5–41)
ANION GAP SERPL CALCULATED.3IONS-SCNC: 8 MMOL/L (ref 9–17)
AST SERPL-CCNC: 19 U/L
BASOPHILS # BLD: 0 % (ref 0–2)
BASOPHILS ABSOLUTE: 0 K/UL (ref 0–0.2)
BILIRUB SERPL-MCNC: <0.1 MG/DL (ref 0.3–1.2)
BILIRUBIN DIRECT: <0.08 MG/DL
BILIRUBIN, INDIRECT: ABNORMAL MG/DL (ref 0–1)
BUN BLDV-MCNC: 16 MG/DL (ref 6–20)
BUN/CREAT BLD: ABNORMAL (ref 9–20)
CALCIUM SERPL-MCNC: 9.9 MG/DL (ref 8.6–10.4)
CHLORIDE BLD-SCNC: 102 MMOL/L (ref 98–107)
CO2: 29 MMOL/L (ref 20–31)
CREAT SERPL-MCNC: 0.74 MG/DL (ref 0.7–1.2)
DIFFERENTIAL TYPE: ABNORMAL
EOSINOPHILS RELATIVE PERCENT: 0 % (ref 1–4)
GFR AFRICAN AMERICAN: >60 ML/MIN
GFR NON-AFRICAN AMERICAN: >60 ML/MIN
GFR SERPL CREATININE-BSD FRML MDRD: ABNORMAL ML/MIN/{1.73_M2}
GFR SERPL CREATININE-BSD FRML MDRD: ABNORMAL ML/MIN/{1.73_M2}
GLOBULIN: ABNORMAL G/DL (ref 1.5–3.8)
GLUCOSE BLD-MCNC: 115 MG/DL (ref 70–99)
HCT VFR BLD CALC: 42.3 % (ref 40.7–50.3)
HEMOGLOBIN: 13.7 G/DL (ref 13–17)
IMMATURE GRANULOCYTES: 1 %
LIPASE: 31 U/L (ref 13–60)
LYMPHOCYTES # BLD: 6 % (ref 24–44)
MCH RBC QN AUTO: 30.8 PG (ref 25.2–33.5)
MCHC RBC AUTO-ENTMCNC: 32.4 G/DL (ref 28.4–34.8)
MCV RBC AUTO: 95.1 FL (ref 82.6–102.9)
MONOCYTES # BLD: 2 % (ref 1–7)
MORPHOLOGY: NORMAL
NRBC AUTOMATED: 0 PER 100 WBC
PDW BLD-RTO: 12.5 % (ref 11.8–14.4)
PLATELET # BLD: 261 K/UL (ref 138–453)
PLATELET ESTIMATE: ABNORMAL
PMV BLD AUTO: 10.3 FL (ref 8.1–13.5)
POTASSIUM SERPL-SCNC: 4.7 MMOL/L (ref 3.7–5.3)
RBC # BLD: 4.45 M/UL (ref 4.21–5.77)
RBC # BLD: ABNORMAL 10*6/UL
SEG NEUTROPHILS: 91 % (ref 36–66)
SEGMENTED NEUTROPHILS ABSOLUTE COUNT: 9.64 K/UL (ref 1.8–7.7)
SODIUM BLD-SCNC: 139 MMOL/L (ref 135–144)
TOTAL PROTEIN: 7.4 G/DL (ref 6.4–8.3)
WBC # BLD: 10.6 K/UL (ref 3.5–11.3)
WBC # BLD: ABNORMAL 10*3/UL

## 2020-10-11 PROCEDURE — 80076 HEPATIC FUNCTION PANEL: CPT

## 2020-10-11 PROCEDURE — 96374 THER/PROPH/DIAG INJ IV PUSH: CPT

## 2020-10-11 PROCEDURE — 2580000003 HC RX 258: Performed by: STUDENT IN AN ORGANIZED HEALTH CARE EDUCATION/TRAINING PROGRAM

## 2020-10-11 PROCEDURE — 6370000000 HC RX 637 (ALT 250 FOR IP): Performed by: STUDENT IN AN ORGANIZED HEALTH CARE EDUCATION/TRAINING PROGRAM

## 2020-10-11 PROCEDURE — 80048 BASIC METABOLIC PNL TOTAL CA: CPT

## 2020-10-11 PROCEDURE — 83690 ASSAY OF LIPASE: CPT

## 2020-10-11 PROCEDURE — 85025 COMPLETE CBC W/AUTO DIFF WBC: CPT

## 2020-10-11 PROCEDURE — 99283 EMERGENCY DEPT VISIT LOW MDM: CPT

## 2020-10-11 PROCEDURE — 6360000002 HC RX W HCPCS: Performed by: STUDENT IN AN ORGANIZED HEALTH CARE EDUCATION/TRAINING PROGRAM

## 2020-10-11 RX ORDER — OMEPRAZOLE 20 MG/1
20 CAPSULE, DELAYED RELEASE ORAL
Qty: 60 CAPSULE | Refills: 0 | Status: SHIPPED | OUTPATIENT
Start: 2020-10-11

## 2020-10-11 RX ORDER — MAGNESIUM HYDROXIDE/ALUMINUM HYDROXICE/SIMETHICONE 120; 1200; 1200 MG/30ML; MG/30ML; MG/30ML
15 SUSPENSION ORAL ONCE
Status: COMPLETED | OUTPATIENT
Start: 2020-10-11 | End: 2020-10-11

## 2020-10-11 RX ORDER — ONDANSETRON 4 MG/1
4 TABLET, ORALLY DISINTEGRATING ORAL EVERY 8 HOURS PRN
Qty: 20 TABLET | Refills: 0 | Status: SHIPPED | OUTPATIENT
Start: 2020-10-11

## 2020-10-11 RX ORDER — ONDANSETRON 2 MG/ML
4 INJECTION INTRAMUSCULAR; INTRAVENOUS ONCE
Status: COMPLETED | OUTPATIENT
Start: 2020-10-11 | End: 2020-10-11

## 2020-10-11 RX ORDER — 0.9 % SODIUM CHLORIDE 0.9 %
1000 INTRAVENOUS SOLUTION INTRAVENOUS ONCE
Status: COMPLETED | OUTPATIENT
Start: 2020-10-11 | End: 2020-10-11

## 2020-10-11 RX ORDER — LIDOCAINE HYDROCHLORIDE 20 MG/ML
15 SOLUTION OROPHARYNGEAL ONCE
Status: COMPLETED | OUTPATIENT
Start: 2020-10-11 | End: 2020-10-11

## 2020-10-11 RX ORDER — CALCIUM CARBONATE 200(500)MG
1 TABLET,CHEWABLE ORAL DAILY
Qty: 30 TABLET | Refills: 0 | Status: SHIPPED | OUTPATIENT
Start: 2020-10-11 | End: 2020-11-10

## 2020-10-11 RX ADMIN — ALUMINUM HYDROXIDE, MAGNESIUM HYDROXIDE, AND SIMETHICONE 15 ML: 200; 200; 20 SUSPENSION ORAL at 08:08

## 2020-10-11 RX ADMIN — LIDOCAINE HYDROCHLORIDE 15 ML: 20 SOLUTION ORAL; TOPICAL at 08:08

## 2020-10-11 RX ADMIN — ONDANSETRON 4 MG: 2 INJECTION INTRAMUSCULAR; INTRAVENOUS at 08:08

## 2020-10-11 RX ADMIN — SODIUM CHLORIDE 1000 ML: 9 INJECTION, SOLUTION INTRAVENOUS at 08:08

## 2020-10-11 NOTE — ED PROVIDER NOTES
[facility]  Emergency Department  Faculty Attestation     I performed a history and physical examination of the patient and discussed management with the resident. I reviewed the residents note and agree with the documented findings and plan of care. Any areas of disagreement are noted on the chart. I was personally present for the key portions of any procedures. I have documented in the chart those procedures where I was not present during the key portions. I have reviewed the emergency nurses triage note. I agree with the chief complaint, past medical history, past surgical history, allergies, medications, social and family history as documented unless otherwise noted below. For Physician Assistant/ Nurse Practitioner cases/documentation I have personally evaluated this patient and have completed at least one if not all key elements of the E/M (history, physical exam, and MDM). Additional findings are as noted. Primary Care Physician:  No primary care provider on file. Screenings:  [unfilled]    CHIEF COMPLAINT       Chief Complaint   Patient presents with    Nausea       RECENT VITALS:   Temp: 97.9 °F (36.6 °C),  Pulse: 71, Resp: 16, BP: 124/77    LABS:  Labs Reviewed   BASIC METABOLIC PANEL W/ REFLEX TO MG FOR LOW K - Abnormal; Notable for the following components:       Result Value    Glucose 115 (*)     Anion Gap 8 (*)     All other components within normal limits   HEPATIC FUNCTION PANEL - Abnormal; Notable for the following components: Total Bilirubin <0.10 (*)     All other components within normal limits   CBC WITH AUTO DIFFERENTIAL   LIPASE       Radiology  No orders to display       CRITICAL CARE: There was a high probability of clinically significant/life threatening deterioration in this patient's condition which required my urgent intervention. Total critical care time was none minutes.   This excludes any time for separately reportable procedures. EKG:      Attending Physician Additional  Notes    She has nausea, bilious vomiting, epigastric abdominal pain, minimal relief with antacids. No use of NSAIDs or alcohol. No emotional stress. No prior EGD. No blood in his stools or dark black tarry stools. No history of gallstones or recent weight loss. On exam he is uncomfortable, pain score now 5/10 after an acid mixture, other vital signs are normal.  Abdomen is tender epigastrium with some voluntary guarding. Minimal right subcostal tenderness but negative Lanier sign. No new mass distention tympany or true rebound. Impression is gastritis consider pancreatitis less likely biliary disease. Plan is IV fluids and acids antiemetics analgesics if needed, laboratory studies and reassess. Anticipate discharge home on a PPI antiemetic with early follow-up. Racheal Koroma MD, [facility]  Attending Emergency  Physician                Kory Chan MD  10/11/20 6734

## 2020-10-11 NOTE — ED PROVIDER NOTES
[facility] ED  Emergency Department Encounter  EmergencyMedicine Resident     Pt Luis Alberto Nunes  MRN: [id number]  [place] 1985  Date of evaluation: 10/11/20  PCP:  No primary care provider on file. CHIEF COMPLAINT       Chief Complaint   Patient presents with    Nausea       HISTORY OF PRESENT ILLNESS  (Location/Symptom, Timing/Onset, Context/Setting, Quality, Duration, Modifying Factors, Severity.)      Danney Hamman is a 38 y.o. male who presents with abdominal pain, nausea, vomiting. Patient presents with 1 day of epigastric abdominal pain, nonradiating, sharp, worse with eating, associated with nausea and vomiting. Patient denies fevers, chills, cough, congestion, lightheadedness, dizziness, visual changes, hearing changes, chest pain, shortness of breath, hematuria, diarrhea, constipation, melena, hematochezia, lower extremity pain or swelling, rash, allergies. Patient has history of smoking and peptic ulcer disease, denies history of alcohol use, NSAIDs. Patient denies any history of abdominal surgeries. PAST MEDICAL / SURGICAL / SOCIAL / FAMILY HISTORY      has a past medical history of Alcohol abuse, Kidney stones, and Seizures ([facility] [address] has no past surgical history on file.   No abdominal surgeries    Social History     Socioeconomic History    Marital status: Single     Spouse name: Not on file    Number of children: Not on file    Years of education: Not on file    Highest education level: Not on file   Occupational History    Not on file   Social Needs    Financial resource strain: Not on file    Food insecurity     Worry: Not on file     Inability: Not on file    Transportation needs     Medical: Not on file     Non-medical: Not on file   Tobacco Use    Smoking status: Current Every Day Smoker     Packs/day: 0.50     Types: Cigarettes    Smokeless tobacco: Former User   Substance and Sexual Activity    Alcohol use: Not Currently     Comment: Sober since September 2018    Drug use: Yes     Types: Marijuana     Comment: very rare    Sexual activity: Yes     Partners: Female   Lifestyle    Physical activity     Days per week: Not on file     Minutes per session: Not on file    Stress: Not on file   Relationships    Social connections     Talks on phone: Not on file     Gets together: Not on file     Attends [employer] service: Not on file     Active member of club or organization: Not on file     Attends meetings of clubs or organizations: Not on file     Relationship status: Not on file    Intimate partner violence     Fear of current or ex partner: Not on file     Emotionally abused: Not on file     Physically abused: Not on file     Forced sexual activity: Not on file   Other Topics Concern    Not on file   Social History Narrative    Not on file       Family History   Family history unknown: Yes       Allergies:  Cashews [macadamia nut oil] and Mushroom extract complex    Home Medications:  Prior to Admission medications    Medication Sig Start Date End Date Taking? Authorizing Provider   calcium carbonate (ANTACID) 500 MG chewable tablet Take 1 tablet by mouth daily 10/11/20 11/10/20 Yes Ton Griffith MD   omeprazole (PRILOSEC) 20 MG delayed release capsule Take 1 capsule by mouth 2 times daily (before meals) 10/11/20  Yes Ton Griffith MD   ondansetron (ZOFRAN ODT) 4 MG disintegrating tablet Take 1 tablet by mouth every 8 hours as needed for Nausea 10/11/20  Yes Ton Griffith MD   sertraline (ZOLOFT) 50 MG tablet Take 1 tablet by mouth daily 1/30/20   KATE Bhat - CNS   traZODone (DESYREL) 50 MG tablet Take 1 tablet by mouth nightly as needed for Sleep 1/29/20   KATE Pyle CNS       REVIEW OF SYSTEMS    (2-9 systems for level 4, 10 or more for level 5)      Review of Systems   Positive for abdominal pain, nausea, vomiting.   Patient denies fevers, chills, cough, congestion, lightheadedness, dizziness, visual changes, hearing changes, chest pain, shortness of breath, hematuria, diarrhea, constipation, melena, hematochezia, lower extremity pain or swelling, rash, allergies.     PHYSICAL EXAM   (up to 7 for level 4, 8 or more for level 5)      INITIAL VITALS:   /77   Pulse 71   Temp 97.9 °F (36.6 °C) (Infrared)   Resp 16   Ht 5' 10\" (1.778 m)   Wt 145 lb (65.8 kg)   SpO2 98%   BMI 20.81 kg/m²     Physical Exam   Patient is alert and oriented, not ill-appearing, openly communicative, EOMI, CTAB, regular rate and rhythm without extra heart sounds, epigastric tenderness, negative Lanier sign, no other abdominal tenderness, distal pulses intact and equal bilaterally    DIFFERENTIAL  DIAGNOSIS     PLAN (LABS / Hira Mood / EKG):  Orders Placed This Encounter   Procedures    CBC Auto Differential    Basic Metabolic Panel w/ Reflex to MG    Hepatic Function Panel    Lipase       MEDICATIONS ORDERED:  Orders Placed This Encounter   Medications    0.9 % sodium chloride bolus    ondansetron (ZOFRAN) injection 4 mg    aluminum & magnesium hydroxide-simethicone (MAALOX) 200-200-20 MG/5ML suspension 15 mL    lidocaine viscous hcl (XYLOCAINE) 2 % solution 15 mL    calcium carbonate (ANTACID) 500 MG chewable tablet     Sig: Take 1 tablet by mouth daily     Dispense:  30 tablet     Refill:  0    omeprazole (PRILOSEC) 20 MG delayed release capsule     Sig: Take 1 capsule by mouth 2 times daily (before meals)     Dispense:  60 capsule     Refill:  0    ondansetron (ZOFRAN ODT) 4 MG disintegrating tablet     Sig: Take 1 tablet by mouth every 8 hours as needed for Nausea     Dispense:  20 tablet     Refill:  0       DDX: Gastritis, peptic ulcer disease, pancreatitis    DIAGNOSTIC RESULTS / EMERGENCY DEPARTMENT COURSE / MDM   LAB RESULTS:  Results for orders placed or performed during the hospital encounter of 10/11/20   CBC Auto Differential   Result Value Ref Range    WBC 10.6 3.5 - 11.3 k/uL    RBC 4.45 4.21 - 5.77 m/uL    Hemoglobin 13.7 13.0 - 17.0 g/dL    Hematocrit 42.3 40.7 - 50.3 %    MCV 95.1 82.6 - 102.9 fL    MCH 30.8 25.2 - 33.5 pg    MCHC 32.4 28.4 - 34.8 g/dL    RDW 12.5 11.8 - 14.4 %    Platelets 281 617 - 631 k/uL    MPV 10.3 8.1 - 13.5 fL    NRBC Automated 0.0 0.0 per 100 WBC    Differential Type NOT REPORTED     WBC Morphology NOT REPORTED     RBC Morphology NOT REPORTED     Platelet Estimate NOT REPORTED     Immature Granulocytes 1 (H) 0 %    Seg Neutrophils 91 (H) 36 - 66 %    Lymphocytes 6 (L) 24 - 44 %    Monocytes 2 1 - 7 %    Eosinophils % 0 (L) 1 - 4 %    Basophils 0 0 - 2 %    Absolute Immature Granulocyte 0.11 0.00 - 0.30 k/uL    Segs Absolute 9.64 (H) 1.8 - 7.7 k/uL    Absolute Lymph # 0.64 (L) 1.0 - 4.8 k/uL    Absolute Mono # 0.21 0.1 - 0.8 k/uL    Absolute Eos # 0.00 0.0 - 0.4 k/uL    Basophils Absolute 0.00 0.0 - 0.2 k/uL    Morphology Normal    Basic Metabolic Panel w/ Reflex to MG   Result Value Ref Range    Glucose 115 (H) 70 - 99 mg/dL    BUN 16 6 - 20 mg/dL    CREATININE 0.74 0.70 - 1.20 mg/dL    Bun/Cre Ratio NOT REPORTED 9 - 20    Calcium 9.9 8.6 - 10.4 mg/dL    Sodium 139 135 - 144 mmol/L    Potassium 4.7 3.7 - 5.3 mmol/L    Chloride 102 98 - 107 mmol/L    CO2 29 20 - 31 mmol/L    Anion Gap 8 (L) 9 - 17 mmol/L    GFR Non-African American >60 >60 mL/min    GFR African American >60 >60 mL/min    GFR Comment          GFR Staging NOT REPORTED    Hepatic Function Panel   Result Value Ref Range    Alb 4.8 3.5 - 5.2 g/dL    Alkaline Phosphatase 94 40 - 129 U/L    ALT 11 5 - 41 U/L    AST 19 <40 U/L    Total Bilirubin <0.10 (L) 0.3 - 1.2 mg/dL    Bilirubin, Direct <0.08 <0.31 mg/dL    Bilirubin, Indirect CANNOT BE CALCULATED 0.00 - 1.00 mg/dL    Total Protein 7.4 6.4 - 8.3 g/dL    Globulin NOT REPORTED 1.5 - 3.8 g/dL    Albumin/Globulin Ratio 1.8 1.0 - 2.5   Lipase   Result Value Ref Range    Lipase 31 13 - 60 U/L       IMPRESSION: 13-year-old male with history of smoking and peptic ulcer disease, no history of NSAID use or alcohol use, presenting with epigastric abdominal pain associated with nausea and vomiting that is worse with eating. Likely secondary to gastritis, will treat symptomatically with Maalox and viscous lidocaine, IV fluids. Will obtain abdominal labs. No indication for imaging at this time    RADIOLOGY:  No results found. EKG      All EKG's are interpreted by the Emergency Department Physician who either signs or Co-signs this chart in the absence of a cardiologist.    EMERGENCY DEPARTMENT COURSE:  Patient came to emergency department, HPI and physical exam were conducted. All nursing notes were reviewed. Labs are within normal limits. On reassessment, patient reports resolution of symptoms. Patient wishes for discharge home. Gave strict return precautions to the emerge department and discharge patient home. Patient remained stable in the emergency department with normal vital signs. Prescribed antacids, omeprazole, Zofran for symptomatic management. Recommend patient follow-up primary care provider in the next few days. PROCEDURES:      CONSULTS:  None    CRITICAL CARE:  Please see attending note    FINAL IMPRESSION      1.  Other acute gastritis without hemorrhage          DISPOSITION / PLAN     DISPOSITION Decision To Discharge 10/11/2020 09:16:59 AM      PATIENT REFERRED TO:  [facility]  [address] [place] [address]  [phone]  Go to   As needed, If symptoms worsen    [address] [address] [phone]  Schedule an appointment as soon as possible for a visit in 3 days  For further management      DISCHARGE MEDICATIONS:  Discharge Medication List as of 10/11/2020  9:22 AM      START taking these medications    Details   calcium carbonate (ANTACID) 500 MG chewable tablet Take 1 tablet by mouth daily, Disp-30 tablet,R-0Print      omeprazole (PRILOSEC) 20 MG delayed release capsule Take 1 capsule by mouth 2 times daily (before meals), Disp-60 capsule,R-0Print      ondansetron (ZOFRAN ODT) 4 MG disintegrating tablet Take 1 tablet by mouth every 8 hours as needed for Nausea, Disp-20 tablet,R-0Print             Mahad Becerra MD  Emergency Medicine Resident    (Please note that portions of thisnote were completed with a voice recognition program.  Efforts were made to edit the dictations but occasionally words are mis-transcribed.)        Mahad Becerra MD  Resident  10/11/20 9583

## 2021-05-11 ENCOUNTER — HOSPITAL ENCOUNTER (EMERGENCY)
Age: 36
Discharge: HOME | End: 2021-05-11
Attending: EMERGENCY MEDICINE
Payer: MEDICAID

## 2021-05-11 VITALS
RESPIRATION RATE: 16 BRPM | BODY MASS INDEX: 20.81 KG/M2 | WEIGHT: 145 LBS | DIASTOLIC BLOOD PRESSURE: 78 MMHG | TEMPERATURE: 98.4 F | SYSTOLIC BLOOD PRESSURE: 124 MMHG | HEART RATE: 83 BPM | OXYGEN SATURATION: 98 %

## 2021-05-11 DIAGNOSIS — B35.3 TINEA PEDIS OF BOTH FEET: Primary | ICD-10-CM

## 2021-05-11 PROCEDURE — 99283 EMERGENCY DEPT VISIT LOW MDM: CPT

## 2021-05-11 RX ORDER — CLOTRIMAZOLE 1 %
CREAM (GRAM) TOPICAL
Qty: 1 TUBE | Refills: 0 | Status: SHIPPED | OUTPATIENT
Start: 2021-05-11 | End: 2021-05-18

## 2021-05-11 ASSESSMENT — ENCOUNTER SYMPTOMS
VOMITING: 0
ABDOMINAL PAIN: 0
BACK PAIN: 0
DIARRHEA: 0
CONSTIPATION: 0
SHORTNESS OF BREATH: 0
COUGH: 0
CHEST TIGHTNESS: 0
NAUSEA: 0
WHEEZING: 0

## 2021-05-11 ASSESSMENT — PAIN DESCRIPTION - PAIN TYPE: TYPE: CHRONIC PAIN

## 2021-05-11 ASSESSMENT — PAIN DESCRIPTION - ORIENTATION: ORIENTATION: LEFT

## 2021-05-12 NOTE — ED PROVIDER NOTES
[facility]  ED  Emergency Department Encounter  EmergencyMedicine Resident     Pt Romario Cancino  MRN: [id number]  [place] 1985  Date of evaluation: 5/11/21  PCP:  No primary care provider on file. CHIEF COMPLAINT       Chief Complaint   Patient presents with    Foot Pain       HISTORY OF PRESENT ILLNESS  (Location/Symptom, Timing/Onset, Context/Setting, Quality, Duration, Modifying Factors, Severity.)      Marcus Grider is a 38 y.o. male who presents with bilateral foot rash. Patient states he has a history of athlete's foot, states this feels similar as it is itching and burning. Patient has diffuse maceration and itching of the bilateral lower feet, in between the toes specifically. Has not used anything for this. Presents with his girlfriend. Denies any pain. Has not used anything for this. Has been going on for what he initially says is several days, then later says a couple weeks. Patient states he has been putting cocoa butter on them without significant improvement. PAST MEDICAL / SURGICAL / SOCIAL / FAMILY HISTORY      has a past medical history of Alcohol abuse, Kidney stones, and Seizures ([facility] [address] has no past surgical history on file.       Social History     Socioeconomic History    Marital status: Single     Spouse name: Not on file    Number of children: Not on file    Years of education: Not on file    Highest education level: Not on file   Occupational History    Not on file   Social Needs    Financial resource strain: Not on file    Food insecurity     Worry: Not on file     Inability: Not on file    Transportation needs     Medical: Not on file     Non-medical: Not on file   Tobacco Use    Smoking status: Current Every Day Smoker     Packs/day: 0.50     Types: Cigarettes    Smokeless tobacco: Former User   Substance and Sexual Activity    Alcohol use: Not Currently     Comment: Sober since September 2018    Drug use: Yes     Types: Marijuana     Comment: very rare    Sexual activity: Yes     Partners: Female   Lifestyle    Physical activity     Days per week: Not on file     Minutes per session: Not on file    Stress: Not on file   Relationships    Social connections     Talks on phone: Not on file     Gets together: Not on file     Attends [employer]ist service: Not on file     Active member of club or organization: Not on file     Attends meetings of clubs or organizations: Not on file     Relationship status: Not on file    Intimate partner violence     Fear of current or ex partner: Not on file     Emotionally abused: Not on file     Physically abused: Not on file     Forced sexual activity: Not on file   Other Topics Concern    Not on file   Social History Narrative    Not on file       Family History   Family history unknown: Yes       Allergies:  Cashews [macadamia nut oil] and Mushroom extract complex    Home Medications:  Prior to Admission medications    Medication Sig Start Date End Date Taking? Authorizing Provider   clotrimazole (LOTRIMIN AF) 1 % cream Apply topically 2 times daily. 5/11/21 5/18/21 Yes Evert Wesley DO   omeprazole (PRILOSEC) 20 MG delayed release capsule Take 1 capsule by mouth 2 times daily (before meals) 10/11/20   Caleb Levy MD   ondansetron (ZOFRAN ODT) 4 MG disintegrating tablet Take 1 tablet by mouth every 8 hours as needed for Nausea 10/11/20   Caleb Levy MD   sertraline (ZOLOFT) 50 MG tablet Take 1 tablet by mouth daily 1/30/20   KATE Melton   traZODone (DESYREL) 50 MG tablet Take 1 tablet by mouth nightly as needed for Sleep 1/29/20   KATE Melton       REVIEW OF SYSTEMS    (2-9 systems for level 4, 10 or more for level 5)      Review of Systems   Constitutional: Negative for chills, diaphoresis, fatigue and fever. Respiratory: Negative for cough, chest tightness, shortness of breath and wheezing.     Cardiovascular: Negative for chest pain, palpitations and leg swelling. Gastrointestinal: Negative for abdominal pain, constipation, diarrhea, nausea and vomiting. Endocrine: Negative for polydipsia. Musculoskeletal: Negative for arthralgias, back pain, neck pain and neck stiffness. Skin: Positive for rash (\"athletes foot\"). Neurological: Negative for weakness, light-headedness, numbness and headaches. PHYSICAL EXAM   (up to 7 for level 4, 8 or more for level 5)      INITIAL VITALS:   /78   Pulse 83   Temp 98.4 °F (36.9 °C) (Oral)   Resp 16   Wt 145 lb (65.8 kg)   SpO2 98%   BMI 20.81 kg/m²     Physical Exam  Vitals signs and nursing note reviewed. Constitutional:       General: He is not in acute distress. Appearance: He is well-developed. He is not diaphoretic. HENT:      Head: Normocephalic and atraumatic. Nose: Nose normal. No congestion or rhinorrhea. Mouth/Throat:      Mouth: Mucous membranes are moist.      Pharynx: Oropharynx is clear. Eyes:      Conjunctiva/sclera: Conjunctivae normal.      Pupils: Pupils are equal, round, and reactive to light. Neck:      Musculoskeletal: Normal range of motion and neck supple. Vascular: No JVD. Trachea: No tracheal deviation. Cardiovascular:      Rate and Rhythm: Normal rate and regular rhythm. Heart sounds: Normal heart sounds. No murmur. No friction rub. Pulmonary:      Effort: Pulmonary effort is normal. No respiratory distress. Breath sounds: Normal breath sounds. No wheezing or rales. Chest:      Chest wall: No tenderness. Abdominal:      General: Bowel sounds are normal. There is no distension. Palpations: Abdomen is soft. Tenderness: There is no abdominal tenderness. There is no guarding. Skin:     General: Skin is warm and dry. Capillary Refill: Capillary refill takes less than 2 seconds. Coloration: Skin is not pale.       Findings: Rash (Maceration and mild erythema on the bottom of the bilateral feet, specifically between the webspaces.) present. No erythema. Neurological:      Mental Status: He is alert and oriented to person, place, and time. Cranial Nerves: No cranial nerve deficit. Psychiatric:         Mood and Affect: Mood normal.         Behavior: Behavior normal.         DIFFERENTIAL  DIAGNOSIS     PLAN (LABS / IMAGING / EKG):  No orders of the defined types were placed in this encounter. MEDICATIONS ORDERED:  Orders Placed This Encounter   Medications    clotrimazole (LOTRIMIN AF) 1 % cream     Sig: Apply topically 2 times daily. Dispense:  1 Tube     Refill:  0       DDX: Tinea pedis    MDM/IMPRESSION: Is a 49-year-old male presenting with tinea pedis. Has not tried thing at home for it other than cocoa butter. Recommend keeping them dry. Will prescribe clotrimazole cream to be used twice per day. Will discharge. DIAGNOSTIC RESULTS / EMERGENCY DEPARTMENT COURSE / MDM   LAB RESULTS:  No results found for this visit on 05/11/21. RADIOLOGY:  No orders to display        EKG      All EKG's are interpreted by the Emergency Department Physician who either signs or Co-signs this chart in the absence of a cardiologist.    EMERGENCY DEPARTMENT COURSE:    Patient provided clotrimazole, and discharge. PROCEDURES:      CONSULTS:  None    CRITICAL CARE:      FINAL IMPRESSION      1. Tinea pedis of both feet          DISPOSITION / PLAN     DISPOSITION Decision To Discharge 05/11/2021 10:18:50 PM      PATIENT REFERRED TO:  [facility]  [address] [facility] [address] [phone]  Go to   If symptoms worsen    [facility] [address] [facility]  [phone]  Schedule an appointment as soon as possible for a visit in 3 days        DISCHARGE MEDICATIONS:  New Prescriptions    CLOTRIMAZOLE (LOTRIMIN AF) 1 % CREAM    Apply topically 2 times daily.        Celia Louise DO  Emergency Medicine Resident    (Please note that portions of thisnote were completed with a voice recognition program.  Efforts were made to edit the dictations but occasionally words are mis-transcribed.)     Kala Ruiz DO  Resident  05/11/21 4752

## 2021-05-12 NOTE — ED TRIAGE NOTES
Pt to ED with c/o chronic itching, burning and pain in the lt foot. Pt has previous dx of athletes foot and would like a prescription to get rid of it.

## 2021-05-12 NOTE — ED PROVIDER NOTES
[address] [facility]     Emergency Department     Faculty Attestation    I performed a history and physical examination of the patient and discussed management with the resident. I reviewed the residents note and agree with the documented findings and plan of care. Any areas of disagreement are noted on the chart. I was personally present for the key portions of any procedures. I have documented in the chart those procedures where I was not present during the key portions. I have reviewed the emergency nurses triage note. I agree with the chief complaint, past medical history, past surgical history, allergies, medications, social and family history as documented unless otherwise noted below. For Physician Assistant/ Nurse Practitioner cases/documentation I have personally evaluated this patient and have completed at least one if not all key elements of the E/M (history, physical exam, and MDM). Additional findings are as noted. Primary Care Physician:  No primary care provider on file. CHIEF COMPLAINT       Chief Complaint   Patient presents with    Foot Pain       RECENT VITALS:   Temp: 98.4 °F (36.9 °C),  Pulse: 83, Resp: 16, BP: 124/78    LABS:  Labs Reviewed - No data to display    Radiology  No orders to display         Attending Physician Additional  Notes    The patient is a 69-year-old male who presents for evaluation of a itchy, painful rash to his bilateral feet. He states that he has history of athlete's foot and starting approximately 1 week ago he developed gradual onset, constant, progressive, redness and a white rash to his bilateral feet. He has not taken any medications for his symptoms and does not list any palliating factors. His pain is worse with ambulation. The patient denies any trauma or surgery to his feet. He denies fever, chills, headache, vision changes, neck pain, back pain, chest pain, shortness of breath, ankle pain, knee pain, recent injury or illness.

## 2021-06-12 ENCOUNTER — HOSPITAL ENCOUNTER (EMERGENCY)
Age: 36
Discharge: HOME | End: 2021-06-12
Attending: EMERGENCY MEDICINE
Payer: MEDICAID

## 2021-06-12 VITALS
SYSTOLIC BLOOD PRESSURE: 117 MMHG | OXYGEN SATURATION: 95 % | TEMPERATURE: 99.1 F | DIASTOLIC BLOOD PRESSURE: 78 MMHG | BODY MASS INDEX: 18.75 KG/M2 | RESPIRATION RATE: 16 BRPM | HEIGHT: 70 IN | HEART RATE: 88 BPM | WEIGHT: 131 LBS

## 2021-06-12 DIAGNOSIS — Z53.21 PATIENT LEFT WITHOUT BEING SEEN: Primary | ICD-10-CM

## 2021-06-12 ASSESSMENT — PAIN SCALES - GENERAL: PAINLEVEL_OUTOF10: 3

## 2021-06-12 ASSESSMENT — PAIN DESCRIPTION - PAIN TYPE: TYPE: ACUTE PAIN

## 2021-06-12 ASSESSMENT — PAIN DESCRIPTION - ORIENTATION: ORIENTATION: RIGHT

## 2021-06-12 ASSESSMENT — PAIN DESCRIPTION - LOCATION: LOCATION: EAR

## 2021-06-12 NOTE — PROGRESS NOTES
This patient was never interviewed nor examined by me. I did not participate in the care of this patient.     Joann Juan, PGY-3

## 2021-06-12 NOTE — ED NOTES
Pt didn't come back to room in ED.  Pt didn't answer when name was called in triage      Gricelda Fitch, RN  06/12/21 9025

## 2021-06-12 NOTE — ED PROVIDER NOTES
[facility]  Emergency Department  Faculty Attestation     PERTINENT ATTENDING PHYSICIAN COMMENTS:    Patient left the emergency department prior to taking history or performing physical examination. The patient may have had care initiated by the resident.     Zohra Armenta MD  Attending Emergency Physician    (Please note that portions of this note were completed with a voice recognition program.  Efforts were made to edit the dictations but occasionally words are mis-transcribed.)         Zohra Armenta MD  06/12/21 4782 [Palliative] : Goals of care discussed with patient: Palliative [FreeTextEntry1] : Patient with Waldenstrom's s/p 8 doses of  Rituxan and now on maintenance Imbruvica. \par Rash is maybe drug related, more localized to hands. \par S/p pelvic fracture. \par PLT count is continuing to improve, Hgb is stabilizing in 13.5-14 range. \par He is committed to the program and remains on his Methadone program. Smoking cessation was again discussed, and he remains open to stopping smoking. \par Continue Imbruvica.\par Hep B core (+) on Viread daily; cont to follow Hep B DNA PCR\par Urged pt to have flu shot. \par \par RV 3 month

## 2021-06-12 NOTE — ED NOTES
Bed: 14  Expected date:   Expected time:   Means of arrival:   Comments:     Levon Anne RN  06/12/21 0745

## 2021-06-13 ENCOUNTER — HOSPITAL ENCOUNTER (EMERGENCY)
Age: 36
Discharge: HOME | End: 2021-06-13
Attending: EMERGENCY MEDICINE
Payer: MEDICAID

## 2021-06-13 VITALS
WEIGHT: 135 LBS | HEART RATE: 86 BPM | OXYGEN SATURATION: 96 % | DIASTOLIC BLOOD PRESSURE: 70 MMHG | RESPIRATION RATE: 16 BRPM | HEIGHT: 70 IN | BODY MASS INDEX: 19.33 KG/M2 | SYSTOLIC BLOOD PRESSURE: 117 MMHG | TEMPERATURE: 97.9 F

## 2021-06-13 DIAGNOSIS — H92.01 OTALGIA OF RIGHT EAR: Primary | ICD-10-CM

## 2021-06-13 PROCEDURE — 99283 EMERGENCY DEPT VISIT LOW MDM: CPT

## 2021-06-13 PROCEDURE — 6370000000 HC RX 637 (ALT 250 FOR IP): Performed by: STUDENT IN AN ORGANIZED HEALTH CARE EDUCATION/TRAINING PROGRAM

## 2021-06-13 RX ORDER — GUAIFENESIN 600 MG/1
600 TABLET, EXTENDED RELEASE ORAL 2 TIMES DAILY
Qty: 10 TABLET | Refills: 0 | Status: SHIPPED | OUTPATIENT
Start: 2021-06-13 | End: 2021-06-18

## 2021-06-13 RX ORDER — GUAIFENESIN 600 MG/1
600 TABLET, EXTENDED RELEASE ORAL ONCE
Status: COMPLETED | OUTPATIENT
Start: 2021-06-13 | End: 2021-06-13

## 2021-06-13 RX ADMIN — GUAIFENESIN 600 MG: 600 TABLET, EXTENDED RELEASE ORAL at 21:24

## 2021-06-13 ASSESSMENT — PAIN DESCRIPTION - ORIENTATION: ORIENTATION: RIGHT

## 2021-06-13 ASSESSMENT — PAIN DESCRIPTION - FREQUENCY: FREQUENCY: CONTINUOUS

## 2021-06-13 ASSESSMENT — ENCOUNTER SYMPTOMS
COUGH: 0
EYE DISCHARGE: 0
PHOTOPHOBIA: 0
SORE THROAT: 0

## 2021-06-13 ASSESSMENT — PAIN DESCRIPTION - ONSET: ONSET: ON-GOING

## 2021-06-13 ASSESSMENT — PAIN SCALES - GENERAL: PAINLEVEL_OUTOF10: 5

## 2021-06-13 ASSESSMENT — PAIN DESCRIPTION - PROGRESSION: CLINICAL_PROGRESSION: NOT CHANGED

## 2021-06-13 ASSESSMENT — PAIN DESCRIPTION - DESCRIPTORS: DESCRIPTORS: ACHING

## 2021-06-13 ASSESSMENT — PAIN DESCRIPTION - LOCATION: LOCATION: EAR

## 2021-06-13 ASSESSMENT — PAIN DESCRIPTION - PAIN TYPE: TYPE: ACUTE PAIN

## 2021-06-14 NOTE — ED PROVIDER NOTES
in the Last Year:    Transportation Needs:     Lack of Transportation (Medical):  Lack of Transportation (Non-Medical):    Physical Activity:     Days of Exercise per Week:     Minutes of Exercise per Session:    Stress:     Feeling of Stress :    Social Connections:     Frequency of Communication with Friends and Family:     Frequency of Social Gatherings with Friends and Family:     Attends Hindu Services:     Active Member of Clubs or Organizations:     Attends Club or Organization Meetings:     Marital Status:    Intimate Partner Violence:     Fear of Current or Ex-Partner:     Emotionally Abused:     Physically Abused:     Sexually Abused:        Family History   Family history unknown: Yes        Allergies:  Cashews [macadamia nut oil] and Mushroom extract complex    Home Medications:  Prior to Admission medications    Medication Sig Start Date End Date Taking? Authorizing Provider   guaiFENesin (MUCINEX) 600 MG extended release tablet Take 1 tablet by mouth 2 times daily for 5 days 6/13/21 6/18/21 Yes Sosa Trimble MD   omeprazole (PRILOSEC) 20 MG delayed release capsule Take 1 capsule by mouth 2 times daily (before meals) 10/11/20   Keisha Landon MD   ondansetron (ZOFRAN ODT) 4 MG disintegrating tablet Take 1 tablet by mouth every 8 hours as needed for Nausea 10/11/20   Keisha Landon MD   sertraline (ZOLOFT) 50 MG tablet Take 1 tablet by mouth daily 1/30/20   KATE Chappell   traZODone (DESYREL) 50 MG tablet Take 1 tablet by mouth nightly as needed for Sleep 1/29/20   KATE Farr       REVIEW OFSYSTEMS    (2-9 systems for level 4, 10 or more for level 5)      Review of Systems   Constitutional: Negative for chills and fever. HENT: Positive for ear pain. Negative for congestion, ear discharge and sore throat. Eyes: Negative for photophobia and discharge. Respiratory: Negative for cough. Skin: Negative for rash.        PHYSICAL EXAM   (up to 7 for level 4, 38 y.o. male who presents with right ear pain x2 days. The patient had stable vital signs on arrival, afebrile. On physical exam, tympanic membrane normal in appearance bilaterally. ,  No drainage from the right ear noted. No cervical lymphadenopathy. No posterior oropharynx erythema or exudates. Low suspicion for acute otitis media or otitis externa given the benign physical exam.  We will give the patient a decongestant. Did use proparacaine eardrops while in the emergency department for analgesia. DIAGNOSTIC RESULTS / EMERGENCYDEPARTMENT COURSE / MDM     LABS:  Labs Reviewed - No data to display      RADIOLOGY:  No results found. EKG      All EKG's are interpreted by the Emergency Department Physicianwho either signs or Co-signs this chart in the absence of a cardiologist.    EMERGENCY DEPARTMENT COURSE:      Patient was discharged with dose of Mucinex. Given strict return precautions. PROCEDURES:  None    CONSULTS:  None    CRITICAL CARE:  Please see attending note    FINAL IMPRESSION      1.  Otalgia of right ear          DISPOSITION / PLAN     DISPOSITION Decision To Discharge 06/13/2021 09:13:52 PM      PATIENT REFERRED TO:  [facility] ED  [address] [facility] [address]  [phone]  Go to   If symptoms worsen      DISCHARGE MEDICATIONS:  Discharge Medication List as of 6/13/2021  9:20 PM      START taking these medications    Details   guaiFENesin (MUCINEX) 600 MG extended release tablet Take 1 tablet by mouth 2 times daily for 5 days, Disp-10 tablet, R-0Print             Autumn Wheat MD  Emergency Medicine Resident    (Please note that portions of this note were completed with a voice recognition program.Efforts were made to edit the dictations but occasionally words are mis-transcribed.)        Autumn Wheat MD  Resident  06/13/21 8745

## 2021-06-14 NOTE — ED PROVIDER NOTES
occasionally words are mis-transcribed.  Whenever words are used in this note in any gender, they shall be construed as though they were used in the gender appropriate to the circumstances; and whenever words are used in this note in the singular or plural form, they shall be construed as though they were used in the form appropriate to the circumstances.)    MD Willy Martinez  Attending Emergency Medicine Physician             Haywood Primrose, MD  06/13/21 7203

## 2021-06-26 ENCOUNTER — HOSPITAL ENCOUNTER (EMERGENCY)
Age: 36
Discharge: LEFT BEFORE DISCHARGE | End: 2021-06-26
Payer: MEDICAID

## 2021-06-26 VITALS
OXYGEN SATURATION: 96 % | HEIGHT: 70 IN | TEMPERATURE: 97.5 F | BODY MASS INDEX: 18.61 KG/M2 | SYSTOLIC BLOOD PRESSURE: 127 MMHG | DIASTOLIC BLOOD PRESSURE: 89 MMHG | WEIGHT: 130 LBS | HEART RATE: 86 BPM | RESPIRATION RATE: 12 BRPM

## 2021-06-26 NOTE — ED NOTES
Pt reports genital rash x 1 day, pt states rash is \"from my boxers\", pt denies any discharge, denies pain, pt denies any other signs/symptoms     Ofilia Kun, BRUCE  06/26/21 8865

## 2021-10-28 ENCOUNTER — HOSPITAL ENCOUNTER (EMERGENCY)
Age: 36
Discharge: HOME | End: 2021-10-28
Attending: EMERGENCY MEDICINE
Payer: MEDICAID

## 2021-10-28 ENCOUNTER — APPOINTMENT (OUTPATIENT)
Dept: GENERAL RADIOLOGY | Age: 36
End: 2021-10-28
Payer: MEDICAID

## 2021-10-28 VITALS
HEART RATE: 70 BPM | RESPIRATION RATE: 18 BRPM | BODY MASS INDEX: 21.67 KG/M2 | OXYGEN SATURATION: 99 % | SYSTOLIC BLOOD PRESSURE: 107 MMHG | DIASTOLIC BLOOD PRESSURE: 65 MMHG | WEIGHT: 151 LBS | TEMPERATURE: 97.9 F

## 2021-10-28 DIAGNOSIS — S61.412A LACERATION OF LEFT HAND, INITIAL ENCOUNTER: Primary | ICD-10-CM

## 2021-10-28 PROCEDURE — 73130 X-RAY EXAM OF HAND: CPT

## 2021-10-28 PROCEDURE — 99283 EMERGENCY DEPT VISIT LOW MDM: CPT

## 2021-10-28 PROCEDURE — 6360000002 HC RX W HCPCS: Performed by: STUDENT IN AN ORGANIZED HEALTH CARE EDUCATION/TRAINING PROGRAM

## 2021-10-28 PROCEDURE — 90471 IMMUNIZATION ADMIN: CPT | Performed by: STUDENT IN AN ORGANIZED HEALTH CARE EDUCATION/TRAINING PROGRAM

## 2021-10-28 PROCEDURE — 12001 RPR S/N/AX/GEN/TRNK 2.5CM/<: CPT

## 2021-10-28 PROCEDURE — 6370000000 HC RX 637 (ALT 250 FOR IP): Performed by: STUDENT IN AN ORGANIZED HEALTH CARE EDUCATION/TRAINING PROGRAM

## 2021-10-28 PROCEDURE — 2500000003 HC RX 250 WO HCPCS: Performed by: STUDENT IN AN ORGANIZED HEALTH CARE EDUCATION/TRAINING PROGRAM

## 2021-10-28 PROCEDURE — 90715 TDAP VACCINE 7 YRS/> IM: CPT | Performed by: STUDENT IN AN ORGANIZED HEALTH CARE EDUCATION/TRAINING PROGRAM

## 2021-10-28 RX ORDER — LIDOCAINE HYDROCHLORIDE 10 MG/ML
20 INJECTION, SOLUTION INFILTRATION; PERINEURAL ONCE
Status: COMPLETED | OUTPATIENT
Start: 2021-10-28 | End: 2021-10-28

## 2021-10-28 RX ORDER — ACETAMINOPHEN 500 MG
1000 TABLET ORAL ONCE
Status: COMPLETED | OUTPATIENT
Start: 2021-10-28 | End: 2021-10-28

## 2021-10-28 RX ADMIN — LIDOCAINE HYDROCHLORIDE 20 ML: 10 INJECTION, SOLUTION INFILTRATION; PERINEURAL at 11:31

## 2021-10-28 RX ADMIN — TETANUS TOXOID, REDUCED DIPHTHERIA TOXOID AND ACELLULAR PERTUSSIS VACCINE, ADSORBED 0.5 ML: 5; 2.5; 8; 8; 2.5 SUSPENSION INTRAMUSCULAR at 11:31

## 2021-10-28 RX ADMIN — ACETAMINOPHEN 1000 MG: 500 TABLET ORAL at 11:31

## 2021-10-28 ASSESSMENT — PAIN SCALES - GENERAL
PAINLEVEL_OUTOF10: 2
PAINLEVEL_OUTOF10: 2

## 2021-10-28 ASSESSMENT — PAIN DESCRIPTION - LOCATION: LOCATION: HAND

## 2021-10-28 ASSESSMENT — PAIN DESCRIPTION - PAIN TYPE: TYPE: ACUTE PAIN

## 2021-10-28 ASSESSMENT — PAIN DESCRIPTION - PROGRESSION: CLINICAL_PROGRESSION: NOT CHANGED

## 2021-10-28 ASSESSMENT — ENCOUNTER SYMPTOMS
ABDOMINAL PAIN: 0
SHORTNESS OF BREATH: 0

## 2021-10-28 ASSESSMENT — PAIN DESCRIPTION - ORIENTATION: ORIENTATION: LEFT

## 2021-10-28 ASSESSMENT — PAIN DESCRIPTION - DESCRIPTORS: DESCRIPTORS: ACHING

## 2021-10-28 ASSESSMENT — PAIN DESCRIPTION - ONSET: ONSET: SUDDEN

## 2021-10-28 NOTE — ED PROVIDER NOTES
[facility]  ED  Emergency Department Encounter  EmergencyMedicine Resident     Pt Danny Floers  MRN: [id number]  [place] 1985  Date of evaluation: 10/28/21  PCP:  No primary care provider on file. This patient was evaluated in the Emergency Department for symptoms described in the history of present illness. The patient was evaluated in the context of the global COVID-19 pandemic, which necessitated consideration that the patient might be at risk for infection with the SARS-CoV-2 virus that causes COVID-19. Institutional protocols and algorithms that pertain to the evaluation of patients at risk for COVID-19 are in a state of rapid change based on information released by regulatory bodies including the CDC and federal and state organizations. These policies and algorithms were followed during the patient's care in the ED. CHIEF COMPLAINT       Chief Complaint   Patient presents with    Hand Laceration     left thumb       HISTORY OF PRESENT ILLNESS  (Location/Symptom, Timing/Onset, Context/Setting, Quality, Duration, Modifying Factors, Severity.)      Leonel Henriquez is a 43 y.o. male who presents with a 2 cm laceration to the base of his left thumb. Patient states that he was cutting meat in a fillet type of manner 2 hours prior to arrival and slice this area he is right-hand dominant unknown last tetanus vaccination. No other injuries full range of motion of the hand bleeding is currently controlled no other injuries    PAST MEDICAL / SURGICAL / SOCIAL / FAMILY HISTORY      has a past medical history of Alcohol abuse, Kidney stones, and Seizures ([facility] [address] has no past surgical history on file.       Social History     Socioeconomic History    Marital status: Single     Spouse name: Not on file    Number of children: Not on file    Years of education: Not on file    Highest education level: Not on file   Occupational History    Not on file   Tobacco Use    Smoking status: (DESYREL) 50 MG tablet Take 1 tablet by mouth nightly as needed for Sleep 1/29/20   Boneta Cost, APRN - CNS       REVIEW OF SYSTEMS    (2-9 systems for level 4, 10 or more for level 5)      Review of Systems   Constitutional: Negative for fever. Eyes: Negative for visual disturbance. Respiratory: Negative for shortness of breath. Cardiovascular: Negative for chest pain. Gastrointestinal: Negative for abdominal pain. Genitourinary: Negative for flank pain. Musculoskeletal: Negative for gait problem. Skin: Positive for wound. Allergic/Immunologic: Positive for food allergies. Neurological: Negative for syncope and numbness. Hematological: Negative for adenopathy. Does not bruise/bleed easily. Psychiatric/Behavioral: Negative for agitation. PHYSICAL EXAM   (up to 7 for level 4, 8 or more for level 5)      INITIAL VITALS:   /65   Pulse 70   Temp 97.9 °F (36.6 °C) (Oral)   Resp 18   Wt 151 lb (68.5 kg)   SpO2 99%   BMI 21.67 kg/m²     Physical Exam  Vitals and nursing note reviewed. Constitutional:       General: He is not in acute distress. Appearance: Normal appearance. He is normal weight. HENT:      Head: Normocephalic. Right Ear: External ear normal.      Left Ear: External ear normal.      Nose: Nose normal.      Mouth/Throat:      Pharynx: Oropharynx is clear. Eyes:      Conjunctiva/sclera: Conjunctivae normal.   Cardiovascular:      Rate and Rhythm: Normal rate. Pulses: Normal pulses. Pulmonary:      Effort: Pulmonary effort is normal.   Abdominal:      Palpations: Abdomen is soft. Tenderness: There is no abdominal tenderness. Musculoskeletal:         General: Normal range of motion. Cervical back: Normal range of motion. Skin:     General: Skin is warm. Capillary Refill: Capillary refill takes less than 2 seconds. Findings: Lesion present. Comments: Base left thumb bleeding controlled.  Pms intact   Neurological: Mental Status: He is alert and oriented to person, place, and time. Psychiatric:         Mood and Affect: Mood normal.         DIFFERENTIAL  DIAGNOSIS     PLAN (LABS / IMAGING / EKG):  Orders Placed This Encounter   Procedures    XR HAND LEFT (MIN 3 VIEWS)    Ice to affected area    Vital signs       MEDICATIONS ORDERED:  Orders Placed This Encounter   Medications    Tetanus-Diphth-Acell Pertussis (BOOSTRIX) injection 0.5 mL    acetaminophen (TYLENOL) tablet 1,000 mg    lidocaine 1 % injection 20 mL       DDX: lac    DIAGNOSTIC RESULTS / EMERGENCY DEPARTMENT COURSE / MDM   LAB RESULTS:  No results found for this visit on 10/28/21. IMPRESSION: Patient is alert oriented nontoxic 78-year-old male in no acute distress bleeding is controlled there is a small laceration base of left thumb plan will be x-ray imaging, the strips, Tylenol, ice, cleaning of the wound at bedside suture. Discharge    RADIOLOGY:  XR HAND LEFT (MIN 3 VIEWS)    Result Date: 10/28/2021  EXAMINATION: THREE XRAY VIEWS OF THE LEFT HAND 10/28/2021 11:37 am COMPARISON: None. HISTORY: ORDERING SYSTEM PROVIDED HISTORY: lac eval fx fb TECHNOLOGIST PROVIDED HISTORY: lac eval fx fb Laceration by base of thumb. FINDINGS: No evidence of acute fracture or dislocation. No radiopaque soft tissue foreign bodies are identified by the thumb. Tiny punctate calcific density adjacent to the 5th metacarpal on one view may be related to remote trauma. No acute osseous abnormality or radiopaque foreign body by the thumb.      EKG      All EKG's are interpreted by the Emergency Department Physician who either signs or Co-signs this chart in the absence of a cardiologist.    EMERGENCY DEPARTMENT COURSE:  Seen And evaluated after imaging was unremarkable and provided analgesia wound cleaned at bedside and closed with 3 simple interrupted sutures see procedure note discharged home in stable condition with outpatient follow-up and return precautions    PROCEDURES:  PROCEDURE NOTE - LACERATION CLOSURE    PATIENT NAME: Mehreen Gordon  MEDICAL RECORD NO. [id number]  DATE: 10/28/2021  ATTENDING PHYSICIAN: Desi    PREOPERATIVE DIAGNOSIS: Laceration(s) as follows:  -Location: base of left thumb  -Length: 2 cm  -Layered closure: No    POSTOPERATIVE DIAGNOSIS:  Same  PROCEDURE PERFORMED:  Suture closure of laceration  PERFORMING PHYSICIAN: Racheal Vu DO  ANESTHESIA:  Local utilizing  Lidocaine 1% without epinephrine  ESTIMATED BLOOD LOSS:  Less than 25 ml. DISCUSSION:  Mehreen Gordon is a 43y.o.-year-old male. Patient requires laceration repair. The history and physical examination were reviewed and confirmed. CONSENT: The patient provided verbal consent for this procedure. PROCEDURE:  Prior to starting, the procedure and patient were confirmed by those present. The wound area was irrigated with sterile water, cleansed with povidone iodine scrub and draped in a sterile fashion. The wound area was anesthetized with Lidocaine 1% without epinephrine. The wound was explored with the following results No foreign bodies found. The wound was repaired with 4-0 Prolene using interrupted sutures. The wound was dressed with bacitracin and a bandage. All sponge, instrument and needle counts were correct at the completion of the procedure. The patient tolerated the procedure well. SUTURE COUNT:  Suture count: 3    COMPLICATIONS:  None     Rcaheal Vu DO  11:28 AM, 10/28/21          CONSULTS:  None    CRITICAL CARE:      FINAL IMPRESSION      1. Laceration of left hand, initial encounter          DISPOSITION / PLAN     DISPOSITION    dc    PATIENT REFERRED TO:  [facility] ED  [facility]  [phone]  Go to   If symptoms worsen, As needed, For suture removal in 7 days.     [facility] [address] [phone]  Call today  To establish a primary care physician for follow-up and reevaluation in 1 to 2 days.       DISCHARGE MEDICATIONS:  New Prescriptions    No medications on file       Kandy Swift DO  Emergency Medicine Resident    (Please note that portions of thisnote were completed with a voice recognition program.  Efforts were made to edit the dictations but occasionally words are mis-transcribed.)        Kandy Swift DO  Resident  10/28/21 4577

## 2021-10-28 NOTE — ED PROVIDER NOTES
[facility]     Emergency Department     Faculty Attestation    I performed a history and physical examination of the patient and discussed management with the resident. I have reviewed and agree with the residents findings including all diagnostic interpretations, and treatment plans as written at the time of my review. Any areas of disagreement are noted on the chart. I was personally present for the key portions of any procedures. I have documented in the chart those procedures where I was not present during the key portions. For Physician Assistant/ Nurse Practitioner cases/documentation I have personally evaluated this patient and have completed at least one if not all key elements of the E/M (history, physical exam, and MDM). Additional findings are as noted. This patient was evaluated in the Emergency Department for symptoms described in the history of present illness. The patient was evaluated in the context of the global COVID-19 pandemic, which necessitated consideration that the patient might be at risk for infection with the SARS-CoV-2 virus that causes COVID-19. Institutional protocols and algorithms that pertain to the evaluation of patients at risk for COVID-19 are in a state of rapid change based on information released by regulatory bodies including the CDC and federal and state organizations. These policies and algorithms were followed during the patient's care in the ED. Primary Care Physician: No primary care provider on file. History: This is a 43 y.o. male who presents to the Emergency Department with complaint of laceration. Is a right-hand-dominant individual who cut his left thumb while using a knife. Patient is unsure when his last tetanus immunization was given. Physical:   vitals were not taken for this visit. There is approximately a 2 cm laceration of the base of the left thumb.   Patient has good range of motion sensation light touch intact. Impression: Laceration    Plan: Clean wound, update tetanus immunization, x-ray rule out foreign body      (Please note that portions of this note were completed with a voice recognition program.  Efforts were made to edit the dictations but occasionally words are mis-transcribed.)    Elsy Fox.  Doris Thompson MD, [facility]  Attending Emergency Medicine Physician        Latoya Crane MD  10/28/21 1621

## 2021-11-03 ENCOUNTER — HOSPITAL ENCOUNTER (EMERGENCY)
Age: 36
Discharge: HOME | End: 2021-11-03
Attending: EMERGENCY MEDICINE
Payer: MEDICAID

## 2021-11-03 VITALS
HEART RATE: 76 BPM | BODY MASS INDEX: 20.23 KG/M2 | TEMPERATURE: 97.1 F | SYSTOLIC BLOOD PRESSURE: 114 MMHG | RESPIRATION RATE: 16 BRPM | WEIGHT: 141 LBS | OXYGEN SATURATION: 100 % | DIASTOLIC BLOOD PRESSURE: 83 MMHG

## 2021-11-03 DIAGNOSIS — Z51.89 VISIT FOR WOUND CHECK: Primary | ICD-10-CM

## 2021-11-03 PROCEDURE — 99284 EMERGENCY DEPT VISIT MOD MDM: CPT

## 2021-11-03 ASSESSMENT — ENCOUNTER SYMPTOMS
VOMITING: 0
SORE THROAT: 0
SHORTNESS OF BREATH: 0
NAUSEA: 0
COUGH: 0
ABDOMINAL DISTENTION: 0
CONSTIPATION: 0
WHEEZING: 0
RHINORRHEA: 0
DIARRHEA: 0

## 2021-11-03 NOTE — ED PROVIDER NOTES
[address]  ED  Emergency Department        Pt Name: Scott Delgado  MRN: [id number]  [place] 1985  Date of evaluation: 11/3/21    CHIEF COMPLAINT       Chief Complaint   Patient presents with    Suture / Staple Removal     left thumb       HISTORY OF PRESENT ILLNESS  (Location/Symptom, Timing/Onset, Context/Setting, Quality, Duration, ModifyingFactors, Severity.)      Scott Delgado is a 43 y.o. male who presents with need for sutures that were placed on October 28 after patient was cutting some food and he sustained a laceration to his left thumb. Denies worsening pain no redness no drainage or discharge. States he does not want his sutures in anymore and would like to have them removed. PAST MEDICAL / SURGICAL / SOCIAL / FAMILY HISTORY      has a past medical history of Alcohol abuse, Kidney stones, and Seizures ([facility] [address] has no past surgical history on file. Social History     Socioeconomic History    Marital status: Single     Spouse name: Not on file    Number of children: Not on file    Years of education: Not on file    Highest education level: Not on file   Occupational History    Not on file   Tobacco Use    Smoking status: Current Every Day Smoker     Packs/day: 0.50     Types: Cigarettes    Smokeless tobacco: Former User   Vaping Use    Vaping Use: Never used   Substance and Sexual Activity    Alcohol use: Not Currently     Comment: Sober since September 2018    Drug use: Yes     Types: Marijuana Lisa Postin)     Comment: very rare    Sexual activity: Yes     Partners: Female   Other Topics Concern    Not on file   Social History Narrative    Not on file     Social Determinants of Health     Financial Resource Strain:     Difficulty of Paying Living Expenses:    Food Insecurity:     Worried About Running Out of Food in the Last Year:     [address] in the Last Year:    Transportation Needs:     Lack of Transportation (Medical):      Lack of Transportation (Non-Medical):    Physical Activity:     Days of Exercise per Week:     Minutes of Exercise per Session:    Stress:     Feeling of Stress :    Social Connections:     Frequency of Communication with Friends and Family:     Frequency of Social Gatherings with Friends and Family:     Attends Yazidism Services:     Active Member of Clubs or Organizations:     Attends Club or Organization Meetings:     Marital Status:    Intimate Partner Violence:     Fear of Current or Ex-Partner:     Emotionally Abused:     Physically Abused:     Sexually Abused:        Family History   Family history unknown: Yes       Allergies:  Cashews [macadamia nut oil] and Mushroom extract complex    Home Medications:  Prior to Admission medications    Medication Sig Start Date End Date Taking? Authorizing Provider   omeprazole (PRILOSEC) 20 MG delayed release capsule Take 1 capsule by mouth 2 times daily (before meals) 10/11/20   Hernandez Heck MD   ondansetron (ZOFRAN ODT) 4 MG disintegrating tablet Take 1 tablet by mouth every 8 hours as needed for Nausea 10/11/20   Hernandez Heck MD   sertraline (ZOLOFT) 50 MG tablet Take 1 tablet by mouth daily 1/30/20   KATE Lal   traZODone (DESYREL) 50 MG tablet Take 1 tablet by mouth nightly as needed for Sleep 1/29/20   KATE Lal       REVIEW OF SYSTEMS    (2-9 systems for level 4, 10 or more for level 5)      Review of Systems   Constitutional: Negative for activity change, appetite change, fatigue and fever. HENT: Negative for congestion, rhinorrhea and sore throat. Respiratory: Negative for cough, shortness of breath and wheezing. Cardiovascular: Negative for chest pain, palpitations and leg swelling. Gastrointestinal: Negative for abdominal distention, constipation, diarrhea, nausea and vomiting. Genitourinary: Negative for decreased urine volume and dysuria. Skin: Positive for wound. Negative for rash.    Neurological: Negative for dizziness, weakness, light-headedness, numbness and headaches. PHYSICAL EXAM   (up to 7 for level 4, 8 or more for level 5)     INITIAL VITALS:   /83   Pulse 76   Temp 97.1 °F (36.2 °C)   Resp 16   Wt 141 lb (64 kg)   SpO2 100%   BMI 20.23 kg/m²     Physical Exam  Constitutional:       General: He is not in acute distress. Appearance: Normal appearance. He is not ill-appearing. HENT:      Head: Normocephalic and atraumatic. Eyes:      General:         Right eye: No discharge. Left eye: No discharge. Extraocular Movements: Extraocular movements intact. Pupils: Pupils are equal, round, and reactive to light. Cardiovascular:      Rate and Rhythm: Normal rate. Pulmonary:      Effort: Pulmonary effort is normal. No respiratory distress. Musculoskeletal:      Right lower leg: No edema. Left lower leg: No edema. Skin:     Comments: Patient with 3 Prolene sutures noted on the base of the left thumb, skin is approximated no surrounding erythema or edema no drainage or discharge minimal tenderness on palpation   Neurological:      General: No focal deficit present. Mental Status: He is alert and oriented to person, place, and time. DIFFERENTIAL  DIAGNOSIS     Patient with sutures in place has only been about 6 days. Will remove single suture to see if any gaping occurs. And after single suture was removed wound is able to gape. Will not remove additional sutures at this time and Place Steri-Strips. Patient can return in 2 to 3 days for reassessment for additional suture removal    PLAN (LABS / IMAGING / EKG):  No orders of the defined types were placed in this encounter. MEDICATIONS ORDERED:  No orders of the defined types were placed in this encounter. DIAGNOSTIC RESULTS / EMERGENCY DEPARTMENT COURSE / MDM     LABS:  No results found for this visit on 11/03/21.     IMPRESSION: Wound check        EMERGENCY DEPARTMENT COURSE:    Suture/ Staple Removal Procedure Note  Indication: Wound healed    Procedure: The patient was placed in the appropriate position and the sutures were removed without difficulty. Other items: Suture count: 1    The patient tolerated the procedure well. Complications: wound did gap, steri strips placed, and additoal 2 sutures were NOT removed. FINAL IMPRESSION      1.  Visit for wound check          DISPOSITION / PLAN     DISPOSITION Decision To Discharge 11/03/2021 07:55:24 AM      PATIENT REFERRED TO:  [facility] ED  [address] [place] [address]  [phone]  In 3 days  For suture removal      DISCHARGE MEDICATIONS:  New Prescriptions    No medications on file       Dayanara Virgen DO  8:01 AM    Attending Emergency Physician  [facility] ED    (Please note that portions of this note were completed with a voice recognition program.  Trini Gomez made to edit the dictations but occasionally words are mis-transcribed.)              Norma Virk DO  11/03/21 0801

## 2021-11-05 ENCOUNTER — HOSPITAL ENCOUNTER (EMERGENCY)
Age: 36
Discharge: HOME | End: 2021-11-05
Attending: EMERGENCY MEDICINE
Payer: MEDICAID

## 2021-11-05 VITALS
SYSTOLIC BLOOD PRESSURE: 120 MMHG | RESPIRATION RATE: 18 BRPM | TEMPERATURE: 97.1 F | HEART RATE: 90 BPM | DIASTOLIC BLOOD PRESSURE: 75 MMHG | BODY MASS INDEX: 20.19 KG/M2 | OXYGEN SATURATION: 98 % | WEIGHT: 141 LBS | HEIGHT: 70 IN

## 2021-11-05 DIAGNOSIS — Z48.02 VISIT FOR SUTURE REMOVAL: Primary | ICD-10-CM

## 2021-11-05 PROCEDURE — 99283 EMERGENCY DEPT VISIT LOW MDM: CPT

## 2021-11-05 NOTE — ED PROVIDER NOTES
Paying Living Expenses:    Food Insecurity:     Worried About Running Out of Food in the Last Year:     [address] in the Last Year:    Transportation Needs:     Lack of Transportation (Medical):  Lack of Transportation (Non-Medical):    Physical Activity:     Days of Exercise per Week:     Minutes of Exercise per Session:    Stress:     Feeling of Stress :    Social Connections:     Frequency of Communication with Friends and Family:     Frequency of Social Gatherings with Friends and Family:     Attends Mu-ism Services:     Active Member of Clubs or Organizations:     Attends Club or Organization Meetings:     Marital Status:    Intimate Partner Violence:     Fear of Current or Ex-Partner:     Emotionally Abused:     Physically Abused:     Sexually Abused:        Family History   Family history unknown: Yes       Allergies:  Cashews [macadamia nut oil] and Mushroom extract complex    Home Medications:  Prior to Admission medications    Medication Sig Start Date End Date Taking? Authorizing Provider   omeprazole (PRILOSEC) 20 MG delayed release capsule Take 1 capsule by mouth 2 times daily (before meals) 10/11/20   Ambika Zhu MD   ondansetron (ZOFRAN ODT) 4 MG disintegrating tablet Take 1 tablet by mouth every 8 hours as needed for Nausea 10/11/20   Ambika Zhu MD   sertraline (ZOLOFT) 50 MG tablet Take 1 tablet by mouth daily 1/30/20   KATE Ramirez   traZODone (DESYREL) 50 MG tablet Take 1 tablet by mouth nightly as needed for Sleep 1/29/20   KATE Ramirez       REVIEW OF SYSTEMS    (2-9 systems for level 4, 10 or more forlevel 5)      Review of Systems   Musculoskeletal:        Left thumb laceration with sutures, no pain, no NV deficits    Skin: Positive for wound. Negative for rash. No concerns for infection   All other systems reviewed and are negative.       PHYSICAL EXAM   (up to 7 for level 4, 8 or more forlevel 5)      ED TRIAGE VITALS BP: infection, patient has full range of motion of the left thumb without any neurovascular deficits. The laceration is well-healing, the site where the previous suture was removed on 11/3 is minimally gaping compared to the rest of the wound. Overall it appears that the wound will heal without any difficulty. LABS:  No results found for this visit on 11/05/21. RADIOLOGY:  No orders to display       CONSULTS:  None    CRITICAL CARE:  See attending physician note    FINAL IMPRESSION      1.  Visit for suture removal          DISPOSITION / PLAN     DISPOSITION Decision To Discharge 11/05/2021 07:58:31 AM      PATIENT REFERRED TO:  [facility] ED  [address] [place] [address]  [phone]    If symptoms worsen      DISCHARGE MEDICATIONS:  Discharge Medication List as of 11/5/2021  7:59 AM        Discharge Medication List as of 11/5/2021  7:59 AM           Rodney Cárdenas MD  Emergency Medicine Resident    (Please note that portions of this note were completed with a voice recognition program.  Efforts were made to edit the dictations but occasionally words are mis-transcribed.)       Rodney Cárdenas MD  Resident  11/05/21 5637

## 2021-11-14 ENCOUNTER — HOSPITAL ENCOUNTER (EMERGENCY)
Age: 36
Discharge: HOME | End: 2021-11-14
Attending: EMERGENCY MEDICINE
Payer: MEDICAID

## 2021-11-14 ENCOUNTER — APPOINTMENT (OUTPATIENT)
Dept: GENERAL RADIOLOGY | Age: 36
End: 2021-11-14
Payer: MEDICAID

## 2021-11-14 VITALS
RESPIRATION RATE: 16 BRPM | DIASTOLIC BLOOD PRESSURE: 71 MMHG | HEART RATE: 80 BPM | HEIGHT: 70 IN | SYSTOLIC BLOOD PRESSURE: 118 MMHG | OXYGEN SATURATION: 100 % | TEMPERATURE: 97.7 F | WEIGHT: 141 LBS | BODY MASS INDEX: 20.19 KG/M2

## 2021-11-14 DIAGNOSIS — G89.29 CHRONIC RIGHT SHOULDER PAIN: Primary | ICD-10-CM

## 2021-11-14 DIAGNOSIS — M25.511 CHRONIC RIGHT SHOULDER PAIN: Primary | ICD-10-CM

## 2021-11-14 PROCEDURE — 6370000000 HC RX 637 (ALT 250 FOR IP): Performed by: STUDENT IN AN ORGANIZED HEALTH CARE EDUCATION/TRAINING PROGRAM

## 2021-11-14 PROCEDURE — 99282 EMERGENCY DEPT VISIT SF MDM: CPT

## 2021-11-14 PROCEDURE — 73030 X-RAY EXAM OF SHOULDER: CPT

## 2021-11-14 RX ORDER — IBUPROFEN 800 MG/1
800 TABLET ORAL 2 TIMES DAILY PRN
Qty: 20 TABLET | Refills: 0 | Status: SHIPPED | OUTPATIENT
Start: 2021-11-14

## 2021-11-14 RX ORDER — IBUPROFEN 800 MG/1
800 TABLET ORAL ONCE
Status: COMPLETED | OUTPATIENT
Start: 2021-11-14 | End: 2021-11-14

## 2021-11-14 RX ADMIN — IBUPROFEN 800 MG: 800 TABLET, FILM COATED ORAL at 19:04

## 2021-11-14 ASSESSMENT — ENCOUNTER SYMPTOMS
VOMITING: 0
DIARRHEA: 0
NAUSEA: 0
RHINORRHEA: 0
BACK PAIN: 0
SHORTNESS OF BREATH: 0
SORE THROAT: 0

## 2021-11-14 ASSESSMENT — PAIN SCALES - GENERAL: PAINLEVEL_OUTOF10: 7

## 2021-11-14 NOTE — ED PROVIDER NOTES
[address] [facility]     Emergency Department     Faculty Attestation    I performed a history and physical examination of the patient and discussed management with the resident. I reviewed the resident´s note and agree with the documented findings and plan of care. Any areas of disagreement are noted on the chart. I was personally present for the key portions of any procedures. I have documented in the chart those procedures where I was not present during the key portions. I have reviewed the emergency nurses triage note. I agree with the chief complaint, past medical history, past surgical history, allergies, medications, social and family history as documented unless otherwise noted below. For Physician Assistant/ Nurse Practitioner cases/documentation I have personally evaluated this patient and have completed at least one if not all key elements of the E/M (history, physical exam, and MDM). Additional findings are as noted. Pain lateral right shoulder, no erythema or warmth, no abscess palpated, no skin changes, pain with abduction. Patient has history of rotator cuff injury in high school.      Geovani Hannon MD  11/14/21 [id number]

## 2021-11-14 NOTE — ED PROVIDER NOTES
[address]  ED  Emergency Department Encounter  EmergencyMedicine Resident     Pt Marlene Boast  MRN: [id number]  [place] 1985  Date of evaluation: 11/14/21  PCP:  No primary care provider on file. This patient was evaluated in the Emergency Department for symptoms described in the history of present illness. The patient was evaluated in the context of the global COVID-19 pandemic, which necessitated consideration that the patient might be at risk for infection with the SARS-CoV-2 virus that causes COVID-19. Institutional protocols and algorithms that pertain to the evaluation of patients at risk for COVID-19 are in a state of rapid change based on information released by regulatory bodies including the CDC and federal and state organizations. These policies and algorithms were followed during the patient's care in the ED. CHIEF COMPLAINT       Chief Complaint   Patient presents with    Shoulder Pain     right shoulder pain x 1 month since rotator cuff injury        HISTORY OF PRESENT ILLNESS  (Location/Symptom, Timing/Onset, Context/Setting, Quality, Duration, Modifying Factors, Severity.)      Lea Kasper is a 43 y.o. male who presents with complaints of right shoulder pain is been increasingly worse the past 3 weeks. Past medical history significant for alcohol abuse, kidney stones, seizures. Patient states about 3 weeks ago he got a tetanus shot in his right arm and subsequent worsening of his right shoulder. States he is limited on range of motion. Denies any trauma to the region. Denies any fever, chills, history of gout, chest pain, shortness of breath, numbness or tingling in the right arm. PAST MEDICAL / SURGICAL / SOCIAL / FAMILY HISTORY      has a past medical history of Alcohol abuse, Kidney stones, and Seizures ([facility] [address] has no past surgical history on file.       Social History     Socioeconomic History    Marital status: Single     Spouse name: Not on file    Number of children: Not on file    Years of education: Not on file    Highest education level: Not on file   Occupational History    Not on file   Tobacco Use    Smoking status: Current Every Day Smoker     Packs/day: 0.50     Types: Cigarettes    Smokeless tobacco: Former User   Vaping Use    Vaping Use: Never used   Substance and Sexual Activity    Alcohol use: Not Currently     Comment: Sober since September 2018    Drug use: Yes     Types: Marijuana Olivia Walter)     Comment: very rare    Sexual activity: Yes     Partners: Female   Other Topics Concern    Not on file   Social History Narrative    Not on file     Social Determinants of Health     Financial Resource Strain:     Difficulty of Paying Living Expenses: Not on file   Food Insecurity:     Worried About Running Out of Food in the Last Year: Not on file    [place] of Food in the Last Year: Not on file   Transportation Needs:     Lack of Transportation (Medical): Not on file    Lack of Transportation (Non-Medical):  Not on file   Physical Activity:     Days of Exercise per Week: Not on file    Minutes of Exercise per Session: Not on file   Stress:     Feeling of Stress : Not on file   Social Connections:     Frequency of Communication with Friends and Family: Not on file    Frequency of Social Gatherings with Friends and Family: Not on file    Attends Pentecostal Services: Not on file    Active Member of [address] [place] [address] or Organizations: Not on file    Attends Club or Organization Meetings: Not on file    Marital Status: Not on file   Intimate Partner Violence:     Fear of Current or Ex-Partner: Not on file    Emotionally Abused: Not on file    Physically Abused: Not on file    Sexually Abused: Not on file   Housing Stability:     Unable to Pay for Housing in the Last Year: Not on file    Number of [place] in the Last Year: Not on file    Unstable Housing in the Last Year: Not on file       Family History   Family history unknown: Yes       Allergies:  Cashews [macadamia nut oil] and Mushroom extract complex    Home Medications:  Prior to Admission medications    Medication Sig Start Date End Date Taking? Authorizing Provider   ibuprofen (ADVIL;MOTRIN) 800 MG tablet Take 1 tablet by mouth 2 times daily as needed for Pain 11/14/21  Yes Bettey All, DO   omeprazole (PRILOSEC) 20 MG delayed release capsule Take 1 capsule by mouth 2 times daily (before meals) 10/11/20   Stephon Hassan MD   ondansetron (ZOFRAN ODT) 4 MG disintegrating tablet Take 1 tablet by mouth every 8 hours as needed for Nausea 10/11/20   Stephon Hassan MD   sertraline (ZOLOFT) 50 MG tablet Take 1 tablet by mouth daily 1/30/20   KATE Mayo - CNS   traZODone (DESYREL) 50 MG tablet Take 1 tablet by mouth nightly as needed for Sleep 1/29/20   KATE Arreaga - CNS       REVIEW OF SYSTEMS    (2-9 systems for level 4, 10 or more for level 5)      Review of Systems   Constitutional: Negative for chills, fatigue and fever. HENT: Negative for congestion, rhinorrhea and sore throat. Respiratory: Negative for shortness of breath. Cardiovascular: Negative for chest pain. Gastrointestinal: Negative for diarrhea, nausea and vomiting. Musculoskeletal: Negative for back pain, joint swelling and neck stiffness. Right shoulder pain   Skin: Negative for rash and wound. Neurological: Negative for weakness and headaches. Psychiatric/Behavioral: Negative for confusion. PHYSICAL EXAM   (up to 7 for level 4, 8 or more for level 5)      INITIAL VITALS:   /71   Pulse 80   Temp 97.7 °F (36.5 °C) (Tympanic)   Resp 16   Ht 5' 10\" (1.778 m)   Wt 141 lb (64 kg)   SpO2 100%   BMI 20.23 kg/m²     Physical Exam  Constitutional:       General: He is not in acute distress. Appearance: Normal appearance. He is not ill-appearing, toxic-appearing or diaphoretic. HENT:      Nose: No congestion or rhinorrhea.       Mouth/Throat:      Pharynx: No oropharyngeal exudate or posterior oropharyngeal erythema. Eyes:      General:         Right eye: No discharge. Left eye: No discharge. Cardiovascular:      Rate and Rhythm: Normal rate and regular rhythm. Heart sounds: No murmur heard. No friction rub. No gallop. Pulmonary:      Effort: No respiratory distress. Breath sounds: No stridor. No wheezing, rhonchi or rales. Chest:      Chest wall: No tenderness. Abdominal:      General: There is no distension. Palpations: There is no mass. Tenderness: There is no abdominal tenderness. There is no guarding or rebound. Hernia: No hernia is present. Musculoskeletal:      Comments: Right shoulder tenderness palpation. No overlying warmth, fluctuance or erythema. Pain elicited with active as well as passive range of motion, most noted in abduction. No crepitus on examination. Neurovascular intact with 2+ distal pulses bilaterally. Sensation intact over the deltoid muscle   Skin:     Capillary Refill: Capillary refill takes less than 2 seconds. Coloration: Skin is not jaundiced or pale. Findings: No bruising or erythema. Neurological:      Mental Status: He is alert and oriented to person, place, and time. DIFFERENTIAL  DIAGNOSIS     PLAN (LABS / IMAGING / EKG):  Orders Placed This Encounter   Procedures    XR SHOULDER RIGHT (MIN 2 VIEWS)       MEDICATIONS ORDERED:  Orders Placed This Encounter   Medications    ibuprofen (ADVIL;MOTRIN) tablet 800 mg    ibuprofen (ADVIL;MOTRIN) 800 MG tablet     Sig: Take 1 tablet by mouth 2 times daily as needed for Pain     Dispense:  20 tablet     Refill:  0       DDX: Strain/sprain, rotator cuff impingement, bursitis, fracture, dislocation, gout, septic joint    DIAGNOSTIC RESULTS / EMERGENCY DEPARTMENT COURSE / MDM   LAB RESULTS:  No results found for this visit on 11/14/21.     IMPRESSION: N/A    RADIOLOGY:  XR SHOULDER RIGHT (MIN 2 VIEWS)    Result Date: 11/14/2021  EXAMINATION: THREE XRAY VIEWS OF THE RIGHT SHOULDER 11/14/2021 4:12 pm COMPARISON: None. HISTORY: ORDERING SYSTEM PROVIDED HISTORY: right shoulder pain TECHNOLOGIST PROVIDED HISTORY: right shoulder pain Reason for Exam: right shoulder pain x 1 month since rotator cuff injury Acuity: Unknown Type of Exam: Unknown FINDINGS: There is no evidence of acute fracture. There is normal alignment. No acute joint abnormality. No focal osseous lesion. No focal soft tissue abnormality. No acute osseous abnormality. EMERGENCY DEPARTMENT COURSE:  Patient is a 68-year-old male presenting with chief complaint of right shoulder pain is going on for past 3 weeks. Afebrile examination, no overlying warmth erythema the right shoulder. Patient nontoxic-appearing. No history of gout. No trauma to the region. Neurovascularly intact bilaterally. Imaging was negative for acute fracture. Unlikely to be septic joint or gout. Likely an exacerbation of right rotator cuff issues. Patient given prescription for ibuprofen as well as orthopedic follow-up. Patient amenable discharge. Voiced understanding of strict return precautions. PROCEDURES:  n/a    CONSULTS:  None    CRITICAL CARE:  n/a    FINAL IMPRESSION      1.  Chronic right shoulder pain          DISPOSITION / PLAN     DISPOSITION Decision To Discharge 11/14/2021 07:29:50 PM      PATIENT REFERRED TO:  [facility] [address]  [address] [id number] [phone]  Schedule an appointment as soon as possible for a visit         DISCHARGE MEDICATIONS:  Discharge Medication List as of 11/14/2021  7:40 PM      START taking these medications    Details   ibuprofen (ADVIL;MOTRIN) 800 MG tablet Take 1 tablet by mouth 2 times daily as needed for Pain, Disp-20 tablet, R-0Print             Kris Reynolds DO  Emergency Medicine Resident    (Please note that portions of thisnote were completed with a voice recognition program.  Efforts were made to edit the dictations but occasionally words are mis-transcribed.)        Julia Davis DO  Resident  11/14/21 0585

## 2021-11-15 NOTE — PROGRESS NOTES
I assigned myself to this patient in error. I did not see, evaluate, or participate in the care of this patient.     Dion Dillon MD  11/15/21

## 2022-03-30 ENCOUNTER — HOSPITAL ENCOUNTER (EMERGENCY)
Age: 37
Discharge: LEFT BEFORE DISCHARGE | End: 2022-03-30

## 2022-03-30 VITALS
RESPIRATION RATE: 20 BRPM | BODY MASS INDEX: 20.19 KG/M2 | SYSTOLIC BLOOD PRESSURE: 122 MMHG | HEIGHT: 70 IN | OXYGEN SATURATION: 96 % | HEART RATE: 101 BPM | DIASTOLIC BLOOD PRESSURE: 75 MMHG | WEIGHT: 141 LBS | TEMPERATURE: 97.5 F

## 2022-03-30 NOTE — ED TRIAGE NOTES
Patient presented to the ED today with a right thumb laceration. Patient stated that he cut his finger at work yesterday on a slicer.

## 2022-07-21 ENCOUNTER — APPOINTMENT (OUTPATIENT)
Dept: GENERAL RADIOLOGY | Age: 37
End: 2022-07-21
Payer: MEDICAID

## 2022-07-21 ENCOUNTER — HOSPITAL ENCOUNTER (EMERGENCY)
Age: 37
Discharge: HOME | End: 2022-07-21
Attending: EMERGENCY MEDICINE
Payer: MEDICAID

## 2022-07-21 VITALS
HEIGHT: 70 IN | BODY MASS INDEX: 20.04 KG/M2 | TEMPERATURE: 98.6 F | DIASTOLIC BLOOD PRESSURE: 73 MMHG | SYSTOLIC BLOOD PRESSURE: 127 MMHG | RESPIRATION RATE: 18 BRPM | OXYGEN SATURATION: 98 % | HEART RATE: 62 BPM | WEIGHT: 140 LBS

## 2022-07-21 DIAGNOSIS — Z00.00 NORMAL ABDOMINAL EXAM: Primary | ICD-10-CM

## 2022-07-21 PROCEDURE — 99283 EMERGENCY DEPT VISIT LOW MDM: CPT

## 2022-07-21 PROCEDURE — 74018 RADEX ABDOMEN 1 VIEW: CPT

## 2022-07-21 ASSESSMENT — ENCOUNTER SYMPTOMS
SINUS PAIN: 0
COLOR CHANGE: 0
SINUS PRESSURE: 0
VOMITING: 0
ABDOMINAL DISTENTION: 0
FACIAL SWELLING: 0
SORE THROAT: 0
DIARRHEA: 0
PHOTOPHOBIA: 0
CONSTIPATION: 0
TROUBLE SWALLOWING: 0
COUGH: 0
SHORTNESS OF BREATH: 0
ABDOMINAL PAIN: 0
EYE PAIN: 0

## 2022-07-21 ASSESSMENT — PAIN - FUNCTIONAL ASSESSMENT: PAIN_FUNCTIONAL_ASSESSMENT: 0-10

## 2022-07-21 NOTE — ED PROVIDER NOTES
[facility] ED  Emergency Department Encounter  Emergency Medicine Resident     Pt Piter Lora  MRN: [id number]  [place] 1985  Date of evaluation: 7/21/22  PCP:  No primary care provider on file. CHIEF COMPLAINT       Chief Complaint   Patient presents with    Foreign Body     From [facility], saw baggie like object on body scanner        HISTORY OF PRESENT ILLNESS  (Location/Symptom, Timing/Onset, Context/Setting, Quality, Duration, Modifying Factors, Severity.)      April Ortega is a 43 y.o. male who presents to the ED in police custody from [facility] due to an apparent foreign body/baggy in the lower abdomen seen on body scanner. Patient does not know why he was brought to the ED apart from the reason the escorting officers gave. Denies any pertinent past medical history and denied any surgeries. Does not take any medications. States that he is a social drinker, and does not use alcohol or drugs. Denies inserting anything into his rectum recently or in the past.  Patient is alert and oriented x3 and resting comfortably in the bed. Discussed with patient that we would get an abdominal x-ray. Patient was not amenable to a digital rectal exam.    PAST MEDICAL / SURGICAL / SOCIAL / FAMILY HISTORY      has a past medical history of Alcohol abuse, Kidney stones, and Seizures ([facility] [address] has no past surgical history on file.       Social History     Socioeconomic History    Marital status: Single     Spouse name: Not on file    Number of children: Not on file    Years of education: Not on file    Highest education level: Not on file   Occupational History    Not on file   Tobacco Use    Smoking status: Every Day     Packs/day: 0.50     Types: Cigarettes    Smokeless tobacco: Former   Vaping Use    Vaping Use: Never used   Substance and Sexual Activity    Alcohol use: Not Currently     Comment: Sober since September 2018    Drug use: Yes     Types: Marijuana (Weed)     Comment: very rare Sexual activity: Yes     Partners: Female   Other Topics Concern    Not on file   Social History Narrative    Not on file     Social Determinants of Health     Financial Resource Strain: Not on file   Food Insecurity: Not on file   Transportation Needs: Not on file   Physical Activity: Not on file   Stress: Not on file   Social Connections: Not on file   Intimate Partner Violence: Not on file   Housing Stability: Not on file       Family History   Family history unknown: Yes       Allergies:  Cashews [macadamia nut oil] and Mushroom extract complex    Home Medications:  Prior to Admission medications    Medication Sig Start Date End Date Taking? Authorizing Provider   ibuprofen (ADVIL;MOTRIN) 800 MG tablet Take 1 tablet by mouth 2 times daily as needed for Pain 11/14/21   Dahiana Tobin DO   omeprazole (PRILOSEC) 20 MG delayed release capsule Take 1 capsule by mouth 2 times daily (before meals) 10/11/20   Jackie Baker MD   ondansetron (ZOFRAN ODT) 4 MG disintegrating tablet Take 1 tablet by mouth every 8 hours as needed for Nausea 10/11/20   Jackie Baker MD   sertraline (ZOLOFT) 50 MG tablet Take 1 tablet by mouth daily 1/30/20   KATE Da Silva   traZODone (DESYREL) 50 MG tablet Take 1 tablet by mouth nightly as needed for Sleep 1/29/20   KATE Da Silva       REVIEW OF SYSTEMS    (2-9 systems for level 4, 10 or more for level 5)      Review of Systems   Constitutional:  Negative for activity change, appetite change, fatigue and fever. HENT:  Negative for congestion, ear pain, facial swelling, sinus pressure, sinus pain, sore throat and trouble swallowing. Eyes:  Negative for photophobia, pain and visual disturbance. Respiratory:  Negative for cough and shortness of breath. Cardiovascular:  Negative for chest pain and palpitations. Gastrointestinal:  Negative for abdominal distention, abdominal pain, constipation, diarrhea and vomiting. Endocrine: Negative for polyuria. Genitourinary:  Negative for difficulty urinating, flank pain, frequency and urgency. Musculoskeletal:  Negative for arthralgias and myalgias. Skin:  Negative for color change and wound. Neurological:  Negative for weakness and headaches. Psychiatric/Behavioral:  The patient is not nervous/anxious. PHYSICAL EXAM   (up to 7 for level 4, 8 or more for level 5)      INITIAL VITALS:   /73   Pulse 62   Temp 98.6 °F (37 °C) (Oral)   Resp 18   Ht 5' 10\" (1.778 m)   Wt 140 lb (63.5 kg)   SpO2 98%   BMI 20.09 kg/m²     Physical Exam  Vitals reviewed. Constitutional:       General: He is not in acute distress. Appearance: Normal appearance. He is normal weight. He is not ill-appearing, toxic-appearing or diaphoretic. HENT:      Head: Normocephalic and atraumatic. Right Ear: External ear normal.      Left Ear: External ear normal.      Nose: Nose normal. No congestion. Mouth/Throat:      Mouth: Mucous membranes are moist.      Pharynx: Oropharynx is clear. Eyes:      Extraocular Movements: Extraocular movements intact. Conjunctiva/sclera: Conjunctivae normal.      Pupils: Pupils are equal, round, and reactive to light. Cardiovascular:      Rate and Rhythm: Normal rate and regular rhythm. Pulses: Normal pulses. Heart sounds: Normal heart sounds. No murmur heard. No friction rub. Pulmonary:      Effort: Pulmonary effort is normal. No respiratory distress. Breath sounds: Normal breath sounds. No wheezing. Abdominal:      General: Abdomen is flat. Bowel sounds are normal. There is no distension. Palpations: Abdomen is soft. There is no mass. Tenderness: There is no abdominal tenderness. There is no guarding or rebound. Hernia: No hernia is present. Musculoskeletal:         General: No swelling, tenderness, deformity or signs of injury. Normal range of motion. Cervical back: Normal range of motion. No rigidity or tenderness. Right lower leg: No edema. Left lower leg: No edema. Skin:     General: Skin is warm and dry. Capillary Refill: Capillary refill takes less than 2 seconds. Neurological:      General: No focal deficit present. Mental Status: He is alert and oriented to person, place, and time. Cranial Nerves: No cranial nerve deficit. Motor: No weakness. Psychiatric:         Mood and Affect: Mood normal.         Behavior: Behavior normal.         Judgment: Judgment normal.       DIFFERENTIAL  DIAGNOSIS     PLAN (LABS / IMAGING / EKG):  Orders Placed This Encounter   Procedures    XR ABDOMEN (KUB) (SINGLE AP VIEW)       MEDICATIONS ORDERED:  No orders of the defined types were placed in this encounter. DDX: Foreign body versus stool burden    DIAGNOSTIC RESULTS / EMERGENCY DEPARTMENT COURSE / MDM   LAB RESULTS:  No results found for this visit on 07/21/22. IMPRESSION: N/A    RADIOLOGY:  XR ABDOMEN (KUB) (SINGLE AP VIEW)    (Results Pending)         EKG  N/A    All EKG's are interpreted by the Emergency Department Physician who either signs or Co-signs this chart in the absence of a cardiologist.    EMERGENCY DEPARTMENT COURSE:  ED Course as of 07/21/22 1948 Thu Jul 21, 2022   1840 Seen at bedside. History and physical performed. [AS]   1900 Inform patient that we will be getting a KUB of abdomen. Patient not amenable to digital rectal exam. [AS]      ED Course User Index  [AS] Deep Yung DO     1930: KUB reviewed. No foreign body seen. Noted stool burden. ED Course as of 07/21/22 1942 Th Jul 21, 2022   1840 Seen at bedside. History and physical performed. [AS]   1900 Inform patient that we will be getting a KUB of abdomen. Patient not amenable to digital rectal exam. [AS]      ED Course User Index  [AS] Deep Yung DO       No notes of EC Admission Criteria type on file.     PROCEDURES:  N/A    CONSULTS:  None    CRITICAL CARE:  N/A    ED Course as of 07/21/22 1942 Thu Jul 21, 2022   1840 Seen at bedside. History and physical performed. [AS]   1900 Inform patient that we will be getting a KUB of abdomen. Patient not amenable to digital rectal exam. [AS]      ED Course User Index  [AS] Santos Llamas DO       FINAL IMPRESSION      No foreign body within rectum or abdominal cavity. Physiologic stool burden. DISPOSITION / PLAN     DISPOSITION    Patient to discharge into the custody of the police. PATIENT REFERRED TO:  No follow-up provider specified.     DISCHARGE MEDICATIONS:  New Prescriptions    No medications on file       Isidra Cuellar DO  Emergency Medicine Resident    (Please note that portions of thisnote were completed with a voice recognition program.  Efforts were made to edit the dictations but occasionally words are mis-transcribed.)        Santos Llamas DO  Resident  07/21/22 9051

## 2022-07-22 NOTE — ED PROVIDER NOTES
I performed a history and physical examination of the patient and discussed management with the resident. I reviewed the residents note and agree with the documented findings and plan of care. Any areas of disagreement are noted on the chart. I was personally present for the key portions of any procedures. I have documented in the chart those procedures where I was not present during the key portions. I have reviewed the emergency nurses triage note. I agree with the chief complaint, past medical history, past surgical history, allergies, medications, social and family history as documented unless otherwise noted below. Documentation of the HPI, Physical Exam and Medical Decision Making performed by medical students or scribes is based on my personal performance of the HPI, PE and MDM. For Phys Assistant/ Nurse Practitioner cases/documentation I have personally evaluated this patient and have completed at least one if not all key elements of the E/M (history, physical exam, and MDM). I find the patient's history and physical exam are consistent with the NP/PA documentation. I agree with the care provided, treatment rendered, disposition and followup plan. Additional findings are as noted. Bill Yu. Jaren Pfeiffer MD  Attending Emergency  Physician      This patient presented to the emergency department in police custody from the [facility] where he had apparently been suspected of having ingested illicit drugs. Patient underwent a \"body scan\" which suggested there was a \"foreign body\" in the lower abdomen. Patient denies ingesting any drugs either orally or rectally. He denies any symptoms. He indicates that he was arrested for possession of powdered cocaine and indicates he had \"a little piece\". He currently denies any symptoms other than being hungry. Awake, alert, coop, responsive. Speech fluent, normal comprehension. Pupils are midsized round, reactive.   Vital signs are negative for hypertension or tachycardia. Lungs clear bilaterally. No rales, rhonchi, wheezes, stridor, retractions. Cardiac-S1S2, RRR, no murmur, rub, or gallop. Abdomen soft, nondistended, nontender. No organomegaly, mass, bruit. Normal bowel sounds. Impression: Medical clearance for incarceration. Plan: Patient be discharged into police custody where they will complete his incarceration.           Miky Fischer MD  07/21/22 2020

## 2023-07-23 ENCOUNTER — HOSPITAL ENCOUNTER (EMERGENCY)
Age: 38
Discharge: HOME | End: 2023-07-23
Attending: EMERGENCY MEDICINE
Payer: MEDICAID

## 2023-07-23 ENCOUNTER — APPOINTMENT (OUTPATIENT)
Dept: CT IMAGING | Age: 38
End: 2023-07-23
Payer: MEDICAID

## 2023-07-23 VITALS
OXYGEN SATURATION: 99 % | SYSTOLIC BLOOD PRESSURE: 115 MMHG | RESPIRATION RATE: 20 BRPM | TEMPERATURE: 97.5 F | HEART RATE: 66 BPM | DIASTOLIC BLOOD PRESSURE: 81 MMHG

## 2023-07-23 DIAGNOSIS — R10.32 LEFT LOWER QUADRANT ABDOMINAL PAIN: Primary | ICD-10-CM

## 2023-07-23 LAB
ANION GAP SERPL CALCULATED.3IONS-SCNC: 11 MMOL/L (ref 9–17)
BASOPHILS # BLD: 0.05 K/UL (ref 0–0.2)
BASOPHILS NFR BLD: 1 % (ref 0–2)
BUN SERPL-MCNC: 9 MG/DL (ref 6–20)
CALCIUM SERPL-MCNC: 8.9 MG/DL (ref 8.6–10.4)
CHLORIDE SERPL-SCNC: 104 MMOL/L (ref 98–107)
CO2 SERPL-SCNC: 25 MMOL/L (ref 20–31)
CREAT SERPL-MCNC: 1 MG/DL (ref 0.7–1.2)
EOSINOPHIL # BLD: 0.05 K/UL (ref 0–0.44)
EOSINOPHILS RELATIVE PERCENT: 1 % (ref 1–4)
ERYTHROCYTE [DISTWIDTH] IN BLOOD BY AUTOMATED COUNT: 12.4 % (ref 11.8–14.4)
GFR SERPL CREATININE-BSD FRML MDRD: >60 ML/MIN/1.73M2
GLUCOSE SERPL-MCNC: 96 MG/DL (ref 70–99)
HCT VFR BLD AUTO: 35.8 % (ref 40.7–50.3)
HGB BLD-MCNC: 11.9 G/DL (ref 13–17)
IMM GRANULOCYTES # BLD AUTO: <0.03 K/UL (ref 0–0.3)
IMM GRANULOCYTES NFR BLD: 0 %
LIPASE SERPL-CCNC: 42 U/L (ref 13–60)
LYMPHOCYTES NFR BLD: 2.18 K/UL (ref 1.1–3.7)
LYMPHOCYTES RELATIVE PERCENT: 26 % (ref 24–43)
MCH RBC QN AUTO: 31.5 PG (ref 25.2–33.5)
MCHC RBC AUTO-ENTMCNC: 33.2 G/DL (ref 28.4–34.8)
MCV RBC AUTO: 94.7 FL (ref 82.6–102.9)
MONOCYTES NFR BLD: 0.51 K/UL (ref 0.1–1.2)
MONOCYTES NFR BLD: 6 % (ref 3–12)
NEUTROPHILS NFR BLD: 66 % (ref 36–65)
NEUTS SEG NFR BLD: 5.5 K/UL (ref 1.5–8.1)
NRBC BLD-RTO: 0 PER 100 WBC
PLATELET # BLD AUTO: 242 K/UL (ref 138–453)
PMV BLD AUTO: 10.1 FL (ref 8.1–13.5)
POTASSIUM SERPL-SCNC: 4.1 MMOL/L (ref 3.7–5.3)
RBC # BLD AUTO: 3.78 M/UL (ref 4.21–5.77)
SODIUM SERPL-SCNC: 140 MMOL/L (ref 135–144)
WBC OTHER # BLD: 8.3 K/UL (ref 3.5–11.3)

## 2023-07-23 PROCEDURE — 83690 ASSAY OF LIPASE: CPT

## 2023-07-23 PROCEDURE — 6370000000 HC RX 637 (ALT 250 FOR IP)

## 2023-07-23 PROCEDURE — 85027 COMPLETE CBC AUTOMATED: CPT

## 2023-07-23 PROCEDURE — 80048 BASIC METABOLIC PNL TOTAL CA: CPT

## 2023-07-23 PROCEDURE — 99283 EMERGENCY DEPT VISIT LOW MDM: CPT

## 2023-07-23 RX ORDER — ACETAMINOPHEN 325 MG/1
650 TABLET ORAL ONCE
Status: COMPLETED | OUTPATIENT
Start: 2023-07-23 | End: 2023-07-23

## 2023-07-23 RX ADMIN — ACETAMINOPHEN 650 MG: 325 TABLET ORAL at 01:25

## 2023-07-23 ASSESSMENT — ENCOUNTER SYMPTOMS
SORE THROAT: 0
BACK PAIN: 0
RHINORRHEA: 0
DIARRHEA: 0
SHORTNESS OF BREATH: 0
COUGH: 0
EYE REDNESS: 0
VOMITING: 0
EYE PAIN: 0
NAUSEA: 0
ABDOMINAL PAIN: 0

## 2023-07-23 ASSESSMENT — PAIN - FUNCTIONAL ASSESSMENT
PAIN_FUNCTIONAL_ASSESSMENT: 0-10
PAIN_FUNCTIONAL_ASSESSMENT: 0-10

## 2023-07-23 ASSESSMENT — PAIN SCALES - GENERAL
PAINLEVEL_OUTOF10: 5
PAINLEVEL_OUTOF10: 0

## 2023-07-23 ASSESSMENT — PAIN DESCRIPTION - DESCRIPTORS: DESCRIPTORS: SHARP;STABBING

## 2023-07-23 ASSESSMENT — PAIN DESCRIPTION - LOCATION: LOCATION: ABDOMEN

## 2023-07-23 NOTE — ED NOTES
Pt asked if he can void for the urinalysis but pt states \"not at all\".      Altagracia Velasco RN  07/23/23 1526

## 2023-07-23 NOTE — ED NOTES
Pt refusing to have his CT scan and urinalysis. Pt keeps saying he wants to go home.   Resident notified     lIa Spears RN  07/23/23 0600

## 2023-07-23 NOTE — ED NOTES
Pt to ED03 c/o abdominal pain x 2 days. Pt states it's aching wheneve he will eat hot or oily foods. A/o x4. Call light provided.      Agustín Brower RN  07/23/23 [id number]

## 2023-07-23 NOTE — DISCHARGE INSTRUCTIONS
You elected to be discharged without a CT scan this means we could have missed a possible infection or other problem with your abdomen that could be potentially fatal if you have worsening or persistent symptoms return to the emergency department immediately    If given narcotics (opiates) during this Emergency Department visit, please do not drink, drive or operate any machinery for at least 4 - 6 hours. Avoid eating any spicy food, milk type products or drinks that have caffeine in it. Take all medications as prescribed. For pain use ibuprofen (Motrin) or acetaminophen (Tylenol), unless prescribed medications that have acetaminophen in it. You can take over the counter acetaminophen tablets (1 - 2 tablets of the 500-mg strength every 6 hours) or ibuprofen tablets (2 tablets every 4 hours). PLEASE RETURN TO THE EMERGENCY DEPARTMENT IMMEDIATELY for worsening symptoms, or if you develop any concerning symptoms such as: high fever not relieved by acetaminophen (Tylenol) and/or ibuprofen (Motrin), chills, shortness of breath, chest pain, persistent nausea and/or vomiting, numbness, weakness or tingling in the arms or legs or change in color of the extremities, changes in mental status, persistent headache, blurry vision. Return within 8 - 12 hours if you have any of the following: worsening of pain in your abdomen, no food sounds good to you, you continue to vomit, pain goes to your back or testicles, have pain in the abdomen when going over a bump in the car or when you jump up and down, inability to urinate, unable to follow up with your physician, or other any other care or concern.

## 2023-08-30 ENCOUNTER — APPOINTMENT (OUTPATIENT)
Dept: GENERAL RADIOLOGY | Age: 38
End: 2023-08-30
Payer: MEDICAID

## 2023-08-30 ENCOUNTER — HOSPITAL ENCOUNTER (EMERGENCY)
Age: 38
Discharge: LEFT BEFORE DISCHARGE | End: 2023-08-30
Attending: EMERGENCY MEDICINE
Payer: MEDICAID

## 2023-08-30 VITALS
SYSTOLIC BLOOD PRESSURE: 114 MMHG | DIASTOLIC BLOOD PRESSURE: 72 MMHG | OXYGEN SATURATION: 97 % | HEART RATE: 86 BPM | RESPIRATION RATE: 17 BRPM | TEMPERATURE: 97.5 F

## 2023-08-30 DIAGNOSIS — T40.601A OPIATE OVERDOSE, ACCIDENTAL OR UNINTENTIONAL, INITIAL ENCOUNTER (HCC): Primary | ICD-10-CM

## 2023-08-30 LAB
ANION GAP SERPL CALCULATED.3IONS-SCNC: 18 MMOL/L (ref 9–17)
APAP SERPL-MCNC: <5 UG/ML (ref 10–30)
BASOPHILS # BLD: 0.06 K/UL (ref 0–0.2)
BASOPHILS NFR BLD: 1 % (ref 0–2)
BNP SERPL-MCNC: 49 PG/ML
BUN SERPL-MCNC: 13 MG/DL (ref 6–20)
CALCIUM SERPL-MCNC: 9.1 MG/DL (ref 8.6–10.4)
CHLORIDE SERPL-SCNC: 99 MMOL/L (ref 98–107)
CO2 SERPL-SCNC: 24 MMOL/L (ref 20–31)
CREAT SERPL-MCNC: 1.2 MG/DL (ref 0.7–1.2)
EOSINOPHIL # BLD: <0.03 K/UL (ref 0–0.44)
EOSINOPHILS RELATIVE PERCENT: 0 % (ref 1–4)
ERYTHROCYTE [DISTWIDTH] IN BLOOD BY AUTOMATED COUNT: 11.9 % (ref 11.8–14.4)
ETHANOL PERCENT: <0.01 %
ETHANOLAMINE SERPL-MCNC: <10 MG/DL
GFR SERPL CREATININE-BSD FRML MDRD: >60 ML/MIN/1.73M2
GLUCOSE SERPL-MCNC: 281 MG/DL (ref 70–99)
HCT VFR BLD AUTO: 43.5 % (ref 40.7–50.3)
HGB BLD-MCNC: 13.8 G/DL (ref 13–17)
IMM GRANULOCYTES # BLD AUTO: 0.07 K/UL (ref 0–0.3)
IMM GRANULOCYTES NFR BLD: 1 %
LACTIC ACID, SEPSIS WHOLE BLOOD: 4.1 MMOL/L (ref 0.5–1.9)
LYMPHOCYTES NFR BLD: 3.38 K/UL (ref 1.1–3.7)
LYMPHOCYTES RELATIVE PERCENT: 39 % (ref 24–43)
MCH RBC QN AUTO: 30.9 PG (ref 25.2–33.5)
MCHC RBC AUTO-ENTMCNC: 31.7 G/DL (ref 28.4–34.8)
MCV RBC AUTO: 97.3 FL (ref 82.6–102.9)
MONOCYTES NFR BLD: 0.51 K/UL (ref 0.1–1.2)
MONOCYTES NFR BLD: 6 % (ref 3–12)
NEUTROPHILS NFR BLD: 53 % (ref 36–65)
NEUTS SEG NFR BLD: 4.73 K/UL (ref 1.5–8.1)
NRBC BLD-RTO: 0 PER 100 WBC
PLATELET # BLD AUTO: 368 K/UL (ref 138–453)
PMV BLD AUTO: 9.7 FL (ref 8.1–13.5)
POTASSIUM SERPL-SCNC: 4.9 MMOL/L (ref 3.7–5.3)
RBC # BLD AUTO: 4.47 M/UL (ref 4.21–5.77)
SALICYLATES SERPL-MCNC: <1 MG/DL (ref 3–10)
SODIUM SERPL-SCNC: 141 MMOL/L (ref 135–144)
TOXIC TRICYCLIC SC,BLOOD: NEGATIVE
TROPONIN I SERPL HS-MCNC: 7 NG/L (ref 0–22)
WBC OTHER # BLD: 8.8 K/UL (ref 3.5–11.3)

## 2023-08-30 PROCEDURE — 83605 ASSAY OF LACTIC ACID: CPT

## 2023-08-30 PROCEDURE — G0480 DRUG TEST DEF 1-7 CLASSES: HCPCS

## 2023-08-30 PROCEDURE — 71045 X-RAY EXAM CHEST 1 VIEW: CPT

## 2023-08-30 PROCEDURE — 84484 ASSAY OF TROPONIN QUANT: CPT

## 2023-08-30 PROCEDURE — 99285 EMERGENCY DEPT VISIT HI MDM: CPT | Performed by: EMERGENCY MEDICINE

## 2023-08-30 PROCEDURE — 2580000003 HC RX 258: Performed by: STUDENT IN AN ORGANIZED HEALTH CARE EDUCATION/TRAINING PROGRAM

## 2023-08-30 PROCEDURE — 87040 BLOOD CULTURE FOR BACTERIA: CPT

## 2023-08-30 PROCEDURE — 80048 BASIC METABOLIC PNL TOTAL CA: CPT

## 2023-08-30 PROCEDURE — 80179 DRUG ASSAY SALICYLATE: CPT

## 2023-08-30 PROCEDURE — 80307 DRUG TEST PRSMV CHEM ANLYZR: CPT

## 2023-08-30 PROCEDURE — 83880 ASSAY OF NATRIURETIC PEPTIDE: CPT

## 2023-08-30 PROCEDURE — 6370000000 HC RX 637 (ALT 250 FOR IP): Performed by: STUDENT IN AN ORGANIZED HEALTH CARE EDUCATION/TRAINING PROGRAM

## 2023-08-30 PROCEDURE — 80143 DRUG ASSAY ACETAMINOPHEN: CPT

## 2023-08-30 PROCEDURE — 85025 COMPLETE CBC W/AUTO DIFF WBC: CPT

## 2023-08-30 RX ORDER — 0.9 % SODIUM CHLORIDE 0.9 %
1000 INTRAVENOUS SOLUTION INTRAVENOUS ONCE
Status: COMPLETED | OUTPATIENT
Start: 2023-08-30 | End: 2023-08-30

## 2023-08-30 RX ORDER — NALOXONE HYDROCHLORIDE 4 MG/.1ML
1 SPRAY NASAL ONCE
Status: COMPLETED | OUTPATIENT
Start: 2023-08-30 | End: 2023-08-30

## 2023-08-30 RX ADMIN — SODIUM CHLORIDE 1000 ML: 9 INJECTION, SOLUTION INTRAVENOUS at 20:25

## 2023-08-30 RX ADMIN — NALOXONE HYDROCHLORIDE NASAL SPRAY 1 SPRAY: 4 INHALANT NASAL at 21:19

## 2023-08-31 NOTE — ED NOTES
Pt came into the ed via EMS due to being found unresponsive, pt was given 4mg narcan IM and 2mg  of narcan IV. Pt is Aox4 and ambulatory. Pt did vomit. Pt stated that he did not do any drugs. Pt stated that he was just sitting at the cam[place] and he doesn't know what happened.  Pt has no C/O at this time and denies Chest pain, he stated he is just cold          Demetrio Nuñez RN  08/30/23 2035

## 2023-08-31 NOTE — ED PROVIDER NOTES
[facility] ED  Emergency Department Encounter  Emergency Medicine Resident     Pt Tracy Rizzo  MRN: [id number]  [address] [facility] 1985  Date of evaluation: 8/30/23  PCP:  No primary care provider on file. Note Started: 8:26 PM EDT      CHIEF COMPLAINT       Chief Complaint   Patient presents with    Drug Overdose       HISTORY OF PRESENT ILLNESS  (Location/Symptom, Timing/Onset, Context/Setting, Quality, Duration, Modifying Factors, Severity.)      Karthikeyan Craven is a 42 y.o. male who presents with ***    PAST MEDICAL / SURGICAL / SOCIAL / FAMILY HISTORY      has a past medical history of Alcohol abuse, Kidney stones, and Seizures ([address] has no past surgical history on file. Social History     Socioeconomic History    Marital status: Single     Spouse name: Not on file    Number of children: Not on file    Years of education: Not on file    Highest education level: Not on file   Occupational History    Not on file   Tobacco Use    Smoking status: Every Day     Packs/day: 0.50     Types: Cigarettes    Smokeless tobacco: Former   Vaping Use    Vaping Use: Never used   Substance and Sexual Activity    Alcohol use: Not Currently     Comment: Sober since September 2018    Drug use: Yes     Types: Marijuana (Weed)     Comment: very rare    Sexual activity: Yes     Partners: Female   Other Topics Concern    Not on file   Social History Narrative    Not on file     Social Determinants of Health     Financial Resource Strain: Not on file   Food Insecurity: Not on file   Transportation Needs: Not on file   Physical Activity: Not on file   Stress: Not on file   Social Connections: Not on file   Intimate Partner Violence: Not on file   Housing Stability: Not on file       Family History   Family history unknown: Yes       Allergies:  Cashews [macadamia nut oil] and Mushroom extract complex    Home Medications:  Prior to Admission medications    Medication Sig Start Date End Date Taking?  Authorizing respiratory effort  GI: soft, non-tender, non-distended, no masses  MSK: No gross deformities appreciated  Skin: Warm, dry. No rashes  Neuro: Alert and oriented x 4, GCS 15. Sensation and motor function of extremities grossly intact. Psych: Appropriate mood and affect. DDX/DIAGNOSTIC RESULTS / EMERGENCY DEPARTMENT COURSE / MDM     Medical Decision Making  41 yo M presents via EMS after suspected opioid overdose that responded well to Narcan. It was reported that patient received bystander CPR. Patient denies drug use. Denies SI/HI. States that he does not know why he is in the ED but states that he did not do any drugs. Denying any and all complaints at this time. Patient Aox4, GCS 15. Slightly tremulous but otherwise nontoxic and in no acute distress. Patient hypertensive and tachycardic but otherwise VSS. Patient oxygenating well on RA. Concern for opioid overdose. Will obtain labwork, give fluids, treat symptoms, and continue to evaluate in the ED. Amount and/or Complexity of Data Reviewed  Labs: ordered. Decision-making details documented in ED Course. Radiology: ordered. Decision-making details documented in ED Course. ECG/medicine tests: ordered. Risk  Prescription drug management.         EKG  Rhythm: normal sinus   Rate: tachycardia  Axis: normal  Ectopy: none  Conduction: normal  ST Segments: nonspecific changes  T Waves: non specific changes  Q Waves: none    EKG  Impression: non-specific EKG  All EKG's are interpreted by the Emergency Department Physician who either signs or Co-signs this chart in the absence of a cardiologist.    EMERGENCY DEPARTMENT COURSE:    ED Course as of 09/05/23 2142   Wed Aug 30, 2023   2042 CBC with Auto Differential(!):    WBC 8.8   RBC 4.47   Hemoglobin Quant 13.8   Hematocrit 43.5   MCV 97.3   MCH 30.9   MCHC 31.7   RDW 11.9   Platelet Count 268   MPV 9.7   NRBC Automated 0.0   Neutrophils % 53   Lymphocyte % 39   Monocytes % 6   Eosinophils % 0(!)   Basophils % 1

## 2023-08-31 NOTE — ED NOTES
Police called before releasing pt, due to him being a call before release      Rowena Alonso, BRUCE  08/30/23 [id number]

## 2023-08-31 NOTE — DISCHARGE INSTRUCTIONS
You were evaluated in the emergency department after a suspected opioid overdose. It was reported to us that you were receiving chest compressions at that time as well. You were given Narcan and brought to the emergency department. We had ordered labs and tests to determine if you had any injuries or any heart problems. You stated that you did not want to stay in the emergency department. You had medical decision-making capacity. You were given a Narcan kit. Please use this as prescribed. You understand that if you left the emergency department you could suffer worsening condition, permanent disability, and/or death. You left the emergency department is AGAINST MEDICAL ADVICE. Please return to the emergency department for any worsening symptoms, questions or concerns. Please follow-up with your primary care physician as soon as possible.

## 2023-08-31 NOTE — ED NOTES
Pt stated he is not going to give a urine sample and refuse it.       Epi Verduzco RN  08/30/23 2058

## 2023-09-03 LAB
MICROORGANISM SPEC CULT: NORMAL
SERVICE CMNT-IMP: NORMAL
SPECIMEN DESCRIPTION: NORMAL

## 2023-09-04 LAB
MICROORGANISM SPEC CULT: NORMAL
SERVICE CMNT-IMP: NORMAL
SPECIMEN DESCRIPTION: NORMAL

## 2023-09-05 ASSESSMENT — ENCOUNTER SYMPTOMS
VOMITING: 0
BACK PAIN: 0
NAUSEA: 0
DIARRHEA: 0
SHORTNESS OF BREATH: 0
CONSTIPATION: 0
ABDOMINAL PAIN: 0

## 2023-09-06 LAB
EKG ATRIAL RATE: 107 BPM
EKG P AXIS: 82 DEGREES
EKG P-R INTERVAL: 124 MS
EKG Q-T INTERVAL: 364 MS
EKG QRS DURATION: 92 MS
EKG QTC CALCULATION (BAZETT): 485 MS
EKG R AXIS: 87 DEGREES
EKG T AXIS: 63 DEGREES
EKG VENTRICULAR RATE: 107 BPM

## 2024-03-26 NOTE — ED PROVIDER NOTES
[facility] ED     Emergency Department     Faculty Attestation    I performed a history and physical examination of the patient and discussed management with the resident. I reviewed the residents note and agree with the documented findings and plan of care. Any areas of disagreement are noted on the chart. I was personally present for the key portions of any procedures. I have documented in the chart those procedures where I was not present during the key portions. I have reviewed the emergency nurses triage note. I agree with the chief complaint, past medical history, past surgical history, allergies, medications, social and family history as documented unless otherwise noted below. For Physician Assistant/ Nurse Practitioner cases/documentation I have personally evaluated this patient and have completed at least one if not all key elements of the E/M (history, physical exam, and MDM). Additional findings are as noted. This patient was evaluated in the Emergency Department for symptoms described in the history of present illness. He/she was evaluated in the context of the global COVID-19 pandemic, which necessitated consideration that the patient might be at risk for infection with the SARS-CoV-2 virus that causes COVID-19. Institutional protocols and algorithms that pertain to the evaluation of patients at risk for COVID-19 are in a state of rapid change based on information released by regulatory bodies including the CDC and federal and state organizations. These policies and algorithms were followed during the patient's care in the ED. Suture removal 9 days left thumb. Wounds clean dry and intact.   Will remove      Critical Care     none    Aleks Gipson MD, Aris Hurst  Attending Emergency  Physician             Aleks Gipson MD  11/05/21 0800 No